# Patient Record
Sex: MALE | Race: WHITE | URBAN - METROPOLITAN AREA
[De-identification: names, ages, dates, MRNs, and addresses within clinical notes are randomized per-mention and may not be internally consistent; named-entity substitution may affect disease eponyms.]

---

## 2019-04-16 ENCOUNTER — OFFICE VISIT (OUTPATIENT)
Dept: FAMILY MEDICINE | Facility: CLINIC | Age: 34
End: 2019-04-16
Payer: COMMERCIAL

## 2019-04-16 ENCOUNTER — TELEPHONE (OUTPATIENT)
Dept: FAMILY MEDICINE | Facility: CLINIC | Age: 34
End: 2019-04-16

## 2019-04-16 VITALS
OXYGEN SATURATION: 98 % | DIASTOLIC BLOOD PRESSURE: 74 MMHG | WEIGHT: 215 LBS | SYSTOLIC BLOOD PRESSURE: 110 MMHG | TEMPERATURE: 97.4 F | HEART RATE: 71 BPM

## 2019-04-16 DIAGNOSIS — G43.809 OTHER MIGRAINE WITHOUT STATUS MIGRAINOSUS, NOT INTRACTABLE: ICD-10-CM

## 2019-04-16 DIAGNOSIS — R42 VERTIGO: ICD-10-CM

## 2019-04-16 DIAGNOSIS — R51.9 PRESSURE IN HEAD: Primary | ICD-10-CM

## 2019-04-16 PROBLEM — J30.2 SEASONAL ALLERGIC RHINITIS: Status: ACTIVE | Noted: 2017-12-15

## 2019-04-16 PROCEDURE — 99203 OFFICE O/P NEW LOW 30 MIN: CPT | Performed by: FAMILY MEDICINE

## 2019-04-16 NOTE — PATIENT INSTRUCTIONS
Please call to schedule MRI: For [facility] or [facility] ([address]) Radiology call [phone]. For [facility] Radiology call [phone].

## 2019-04-16 NOTE — TELEPHONE ENCOUNTER
"Patient scheduled to see Dr. Matias for c/o dizziness and head pressure.     Patient states these symptoms have happened before, but they are worse this time, so he is concerned. Pt informed me that when turning his head to the left or right, he feels a slightly dizzy and vision is a little blurry. He states that he had a headache for two days but no longer has one. He also experienced numbness for two days on his scalp and that the numbness also felt \"deeper\" in his \"head.\" Pt says that at one point within the last few days, he had cloudy thoughts.   Pt denies being confused or having a change in mental status. Denies severe headache, sudden changes in vision, sudden weakness on one side of the body, difficulty speaking or slurred speech, or chest pain.     Due to patient's symptoms, it was recommended that he keep his appointment today to be assessed. He has not ever been seen at this clinic.   Routing to provider as DANIELLE for pt's appointment today.   "

## 2019-04-16 NOTE — PROGRESS NOTES
"  SUBJECTIVE:   Donald Robins is a 33 year old male who presents to clinic today for the following   health issues:      Dizziness      Duration: 1 week, but previously for past 4 months off and on    Description   Feeling faint:  no   Feeling like the surroundings are moving: no   Loss of consciousness or falls: no     Intensity:  moderate    Accompanying signs and symptoms:   Nausea/vomitting: no   Palpitations: no   Weakness in arms or legs: no   Vision or speech changes: YES- vision  Ringing in ears (Tinnitus): no   Hearing loss related to dizziness: no   Other (fevers/chills/sweating/dyspnea): YES- simple head movement makes feel dizzy    History (similar episodes/head trauma/previous evaluation/recent bleeding): no    Precipitating or alleviating factors (new meds/chemicals): None  Worse with activity/head movement: YES- simple head movement    Therapies tried and outcome: None    Headaches      Duration: 4 days    Description  Location: bilateral in the temporal area   Character: pressure, palpitation in head, numb  Frequency:  conctant  Duration:  Prior episodes of length    Intensity:  moderate    Accompanying signs and symptoms:    Precipitating or Alleviating factors:  Nausea/vomiting: no  Dizziness: usually  Weakness or numbness: head  Visual changes: blury  Fever: no   YES- behind sinus feels pressure    History  Head trauma: no   Family history of migraines: no  Previous tests for headaches: no  Neurologist evaluations: no  Able to do daily activities when headache present: YES  Wake with headaches: no   Daily pain medication use: YES- ibuprofen      Pleasant 33 year old new to me in clinic today 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  For last 4 months patient has had sensation of pressure \"deep in head, behind right eye\".  Some chronic right sided head pain that does not go away with ibuprofen.  In last few days acute new vertigo with movement as well.  No other " "weakness.  Does have numbness and tingling to right head that comes and goes, and sensation of \"pressure\" in right head.  Really worried he has an aneurysm or tumor - does have family history of aneurysm.    Additional history: as documented    Reviewed  and updated as needed this visit by clinical staff  Tobacco  Allergies  Meds  Problems  Med Hx  Surg Hx  Fam Hx  Soc Hx          Reviewed and updated as needed this visit by Provider  Problems         ROS:  Constitutional, HEENT, cardiovascular, pulmonary, gi and gu systems are negative, except as otherwise noted.    OBJECTIVE:     /74 (Cuff Size: Adult Large)   Pulse 71   Temp 97.4  F (36.3  C) (Tympanic)   Wt 97.5 kg (215 lb)   SpO2 98%   There is no height or weight on file to calculate BMI.  GENERAL: healthy, alert and no distress  EYES: Eyes grossly normal to inspection, PERRL and conjunctivae and sclerae normal  HENT: ear canals and TM's normal, nose and mouth without ulcers or lesions  NECK: no adenopathy, no asymmetry, masses, or scars and thyroid normal to palpation  RESP: lungs clear to auscultation - no rales, rhonchi or wheezes  NEURO: Normal strength and tone, sensory exam grossly normal, mentation intact, speech normal, cranial nerves 2-12 intact, DTR's normal and symmetric, gait normal including heel/toe/tandem walking, Romberg normal and rapid alternating movements normal  PSYCH: mentation appears normal, affect normal/bright    ASSESSMENT/PLAN:       ICD-10-CM    1. Pressure in head R51 MRA Brain ([place]) w Contrast     MR Brain w/o & w Contrast   2. Vertigo R42 MRA Brain ([place]) w Contrast     MR Brain w/o & w Contrast   3. Other migraine without status migrainosus, not intractable G43.809 MRA Brain ([place]) w Contrast     MR Brain w/o & w Contrast     33 year old with 4 months of progressive head pressure and headache and now with new vertigo in last week, and family history of aneurysm.      Will " get imaging to rule out concerning pathology.  Patient is quite concerned and would like imaging rather than watchful waiting.    Will send mychart with results - if negative may just have been BENIGN POSITIONAL PAROXYSMAL VERTIGO which was discussed, and anxiety.      Will come back to establish care after MRI/MRA completed, and follow up on above.    Discussed with patient, all questions answered, in agreement with this plan, will return or seek further care if not improving or worsening.    Brooklyn Matias MD  [facility]

## 2019-04-24 ENCOUNTER — ANCILLARY PROCEDURE (OUTPATIENT)
Dept: MRI IMAGING | Facility: CLINIC | Age: 34
End: 2019-04-24
Attending: FAMILY MEDICINE
Payer: COMMERCIAL

## 2019-04-24 DIAGNOSIS — G43.809 OTHER MIGRAINE WITHOUT STATUS MIGRAINOSUS, NOT INTRACTABLE: ICD-10-CM

## 2019-04-24 DIAGNOSIS — R42 VERTIGO: ICD-10-CM

## 2019-04-24 DIAGNOSIS — R51.9 PRESSURE IN HEAD: ICD-10-CM

## 2019-04-24 RX ORDER — GADOBUTROL 604.72 MG/ML
10 INJECTION INTRAVENOUS ONCE
Status: COMPLETED | OUTPATIENT
Start: 2019-04-24 | End: 2019-04-24

## 2019-04-24 RX ADMIN — GADOBUTROL 10 ML: 604.72 INJECTION INTRAVENOUS at 19:11

## 2019-04-25 NOTE — DISCHARGE INSTRUCTIONS

## 2019-04-26 NOTE — RESULT ENCOUNTER NOTE
Dear Donald,  I have reviewed your results, and they are all in an acceptable/normal range.  At this point I recommend no change to your plan of care - please let me know if you have any questions or concerns.  Sincerely,  Dr. Brooklyn Matias MD    Thank you for choosing the [facility] MRI as your health care provider. Please don't hesitate to call with any questions or concerns [phone].

## 2019-04-26 NOTE — RESULT ENCOUNTER NOTE
Dear Donald,  I have reviewed your results, and they are all in an acceptable/normal range.  At this point I recommend no change to your plan of care - please let me know if you have any questions or concerns.  Sincerely,  Dr. Brooklyn Matias MD    Thank you for choosing the [facility] MRI as your health care provider. Please don't hesitate to call with any questions or concerns [phone].

## 2019-08-19 ENCOUNTER — OFFICE VISIT (OUTPATIENT)
Dept: FAMILY MEDICINE | Facility: CLINIC | Age: 34
End: 2019-08-19
Payer: COMMERCIAL

## 2019-08-19 VITALS
HEIGHT: 70 IN | DIASTOLIC BLOOD PRESSURE: 70 MMHG | RESPIRATION RATE: 20 BRPM | HEART RATE: 68 BPM | OXYGEN SATURATION: 97 % | SYSTOLIC BLOOD PRESSURE: 118 MMHG | TEMPERATURE: 98.9 F | WEIGHT: 208 LBS | BODY MASS INDEX: 29.78 KG/M2

## 2019-08-19 DIAGNOSIS — R20.2 NUMBNESS AND TINGLING IN BOTH HANDS: Primary | ICD-10-CM

## 2019-08-19 DIAGNOSIS — Z83.3 FAMILY HISTORY OF DIABETES MELLITUS: ICD-10-CM

## 2019-08-19 DIAGNOSIS — Z82.0 FAMILY HISTORY OF SLEEP APNEA: ICD-10-CM

## 2019-08-19 DIAGNOSIS — R39.9 URINARY SYMPTOM OR SIGN: ICD-10-CM

## 2019-08-19 DIAGNOSIS — R09.81 NASAL CONGESTION: ICD-10-CM

## 2019-08-19 DIAGNOSIS — G47.00 INSOMNIA, UNSPECIFIED TYPE: ICD-10-CM

## 2019-08-19 DIAGNOSIS — R20.0 NUMBNESS AND TINGLING IN BOTH HANDS: Primary | ICD-10-CM

## 2019-08-19 LAB
ALBUMIN UR-MCNC: 30 MG/DL
APPEARANCE UR: CLEAR
BILIRUB UR QL STRIP: NEGATIVE
COLOR UR AUTO: YELLOW
GLUCOSE UR STRIP-MCNC: NEGATIVE MG/DL
HGB UR QL STRIP: NEGATIVE
KETONES UR STRIP-MCNC: NEGATIVE MG/DL
LEUKOCYTE ESTERASE UR QL STRIP: NEGATIVE
NITRATE UR QL: NEGATIVE
PH UR STRIP: 7 PH (ref 5–7)
RBC #/AREA URNS AUTO: NORMAL /HPF
SOURCE: ABNORMAL
SP GR UR STRIP: 1.02 (ref 1–1.03)
UROBILINOGEN UR STRIP-ACNC: 0.2 EU/DL (ref 0.2–1)
WBC #/AREA URNS AUTO: NORMAL /HPF

## 2019-08-19 PROCEDURE — 85025 COMPLETE CBC W/AUTO DIFF WBC: CPT | Performed by: INTERNAL MEDICINE

## 2019-08-19 PROCEDURE — 81001 URINALYSIS AUTO W/SCOPE: CPT | Performed by: INTERNAL MEDICINE

## 2019-08-19 PROCEDURE — 84443 ASSAY THYROID STIM HORMONE: CPT | Performed by: INTERNAL MEDICINE

## 2019-08-19 PROCEDURE — 80053 COMPREHEN METABOLIC PANEL: CPT | Performed by: INTERNAL MEDICINE

## 2019-08-19 PROCEDURE — 36415 COLL VENOUS BLD VENIPUNCTURE: CPT | Performed by: INTERNAL MEDICINE

## 2019-08-19 PROCEDURE — 99214 OFFICE O/P EST MOD 30 MIN: CPT | Performed by: INTERNAL MEDICINE

## 2019-08-19 PROCEDURE — 82607 VITAMIN B-12: CPT | Performed by: INTERNAL MEDICINE

## 2019-08-19 NOTE — PATIENT INSTRUCTIONS
Let's plan on appointments with ENT and with the sleep clinic.                          Call [phone] for the ENT clinic.                           I will let you know your lab results.                                                I suggest a follow up appointment here after the evaluations with ENT and the sleep clinic.

## 2019-08-19 NOTE — PROGRESS NOTES
"Subjective     Donald Robins is a 34 year old male who presents to clinic today for the following health issues:    HPI   ED/UC Followup:    Facility:  [facility]  Date of visit: 8/17/2019  Reason for visit: UTI or Prostate issues  Current Status: stable, and per/pt that \"everything checked out ok\". Please see chart for Care Everywhere.             Has a variety of sx.                           Had 2 wks of mild/vague urinary and perineal sx; urine and STD labs at urgent care were all neg/normal.                    Drinking more fluids;sx are improving.                                  Has a variety of other sx; intermittent paresthesias both 5th fingers;sometimes lateral feet also.         Chronic L nasal congestion; surgery for deviated nasal septum did not help.                           Snorts awake.           Sleeps poorly;light sleeper.       He has tried melatonin, but had nightmares.    He avoids caffeine after his morning coffee.                                                Family hx of diabetes, and DARON.                                               Allergies   Allergen Reactions     Sulfa Drugs Rash     BP Readings from Last 3 Encounters:   08/19/19 118/70   04/16/19 110/74    Wt Readings from Last 3 Encounters:   08/19/19 94.3 kg (208 lb)   04/16/19 97.5 kg (215 lb)                      Reviewed and updated as needed this visit by Provider         Review of Systems See above plus  ROS COMP: CONSTITUTIONAL:NEGATIVE for fever, chills, change in weight and POSITIVE  for fatigue  RESP:NEGATIVE for significant cough or SOB  CV: NEGATIVE for chest pain, palpitations or peripheral edema  : Negative for hematuria  NEURO: NEGATIVE for dizziness/lightheadedness, dysarthria and gait disturbance  PSYCHIATRIC: NEGATIVE for changes in mood or affect      Objective    /70 (BP Location: Left arm, Patient Position: Chair, Cuff Size: Adult Large)   Pulse 68   Temp 98.9  F (37.2 "  C)   Resp 20   Wt 94.3 kg (208 lb)   SpO2 97%   Body mass index is 29.84 kg/m .  Physical Exam   GENERAL APPEARANCE: alert and no distress  HENT: Narrow nasal passages  RESP: no rales or rhonchi  CV: regular rates and rhythm, normal S1 S2, no S3 or S4 and no murmur, click or rub  NEURO: Normal strength and tone, mentation intact and speech normal    Diagnostic Test Results:  Results for orders placed or performed in visit on 08/19/19 (from the past 24 hour(s))   *UA reflex to Microscopic and Culture ([place] and [facility] (except [place] and [place])   Result Value Ref Range    Color Urine Yellow     Appearance Urine Clear     Glucose Urine Negative NEG^Negative mg/dL    Bilirubin Urine Negative NEG^Negative    Ketones Urine Negative NEG^Negative mg/dL    Specific Gravity Urine 1.020 1.003 - 1.035    Blood Urine Negative NEG^Negative    pH Urine 7.0 5.0 - 7.0 pH    Protein Albumin Urine 30 (A) NEG^Negative mg/dL    Urobilinogen Urine 0.2 0.2 - 1.0 EU/dL    Nitrite Urine Negative NEG^Negative    Leukocyte Esterase Urine Negative NEG^Negative    Source Midstream Urine    Urine Microscopic   Result Value Ref Range    WBC Urine 0 - 5 OTO5^0 - 5 /HPF    RBC Urine O - 2 OTO2^O - 2 /HPF           Assessment & Plan     Donald was seen today for uti.    Diagnoses and all orders for this visit:    Numbness and tingling in both hands  -     Comprehensive metabolic panel (BMP + Alb, Alk Phos, ALT, AST, Total. Bili, TP)  -     Vitamin B12  -     CBC with platelets and differential  -     TSH with free T4 reflex    Insomnia, unspecified type  -     SLEEP EVALUATION & MANAGEMENT REFERRAL - ADULT -[facility] [place] [phone] (Age 15 and up); Future    Nasal congestion  -     OTOLARYNGOLOGY REFERRAL    Family history of diabetes mellitus  -     Comprehensive metabolic panel (BMP + Alb, Alk Phos, ALT, AST, Total. Bili, TP)    Family history of sleep apnea  -     SLEEP EVALUATION & MANAGEMENT  REFERRAL - ADULT -[facility] [place] [phone] (Age 15 and up); Future    Urinary symptom or sign  -     *UA reflex to Microscopic and Culture ([facility] (except [place] and [place])  -     Urine Microscopic           Summary and implications:  We reviewed multiple issues.           We reviewed all of the issues on the diagnoses list.                He has a variety of symptoms and issues. He may be dealing with ulnar neuropathy. I advised that he may replace his elbows on hard surfaces.        Lab work ordered to include B12 and thyroid level and nonfasting glucose.               He has disturbed sleep, and chronic nasal obstruction/congestion.                    Patient Instructions   Let's plan on appointments with ENT and with the sleep clinic.                          Call [phone] for the ENT clinic.                           I will let you know your lab results.                                                I suggest a follow up appointment here after the evaluations with ENT and the sleep clinic.            Return in about 3 months (around 11/19/2019) for follow up of several issues.    Leif Donahue MD  [facility]    Results for orders placed or performed in visit on 08/19/19   *UA reflex to Microscopic and Culture ([facility] (except [place] and [place])   Result Value Ref Range    Color Urine Yellow     Appearance Urine Clear     Glucose Urine Negative NEG^Negative mg/dL    Bilirubin Urine Negative NEG^Negative    Ketones Urine Negative NEG^Negative mg/dL    Specific Gravity Urine 1.020 1.003 - 1.035    Blood Urine Negative NEG^Negative    pH Urine 7.0 5.0 - 7.0 pH    Protein Albumin Urine 30 (A) NEG^Negative mg/dL    Urobilinogen Urine 0.2 0.2 - 1.0 EU/dL    Nitrite Urine Negative NEG^Negative    Leukocyte Esterase Urine Negative NEG^Negative    Source Midstream Urine    Urine Microscopic   Result  Value Ref Range    WBC Urine 0 - 5 OTO5^0 - 5 /HPF    RBC Urine O - 2 OTO2^O - 2 /HPF   Comprehensive metabolic panel (BMP + Alb, Alk Phos, ALT, AST, Total. Bili, TP)   Result Value Ref Range    Sodium 141 133 - 144 mmol/L    Potassium 4.3 3.4 - 5.3 mmol/L    Chloride 107 94 - 109 mmol/L    Carbon Dioxide 24 20 - 32 mmol/L    Anion Gap 10 3 - 14 mmol/L    Glucose 98 70 - 99 mg/dL    Urea Nitrogen 15 7 - 30 mg/dL    Creatinine 1.15 0.66 - 1.25 mg/dL    GFR Estimate 84 >60 mL/min/[1.73_m2]    GFR Estimate If Black >90 >60 mL/min/[1.73_m2]    Calcium 9.1 8.5 - 10.1 mg/dL    Bilirubin Total 0.7 0.2 - 1.3 mg/dL    Albumin 4.4 3.4 - 5.0 g/dL    Protein Total 7.6 6.8 - 8.8 g/dL    Alkaline Phosphatase 62 40 - 150 U/L    ALT 26 0 - 70 U/L    AST 25 0 - 45 U/L   Vitamin B12   Result Value Ref Range    Vitamin B12 964 193 - 986 pg/mL   CBC with platelets and differential   Result Value Ref Range    WBC 7.2 4.0 - 11.0 10e9/L    RBC Count 4.61 4.4 - 5.9 10e12/L    Hemoglobin 14.9 13.3 - 17.7 g/dL    Hematocrit 43.9 40.0 - 53.0 %    MCV 95 78 - 100 fl    MCH 32.3 26.5 - 33.0 pg    MCHC 33.9 31.5 - 36.5 g/dL    RDW 12.1 10.0 - 15.0 %    Platelet Count 190 150 - 450 10e9/L    % Neutrophils 56.2 %    % Lymphocytes 32.5 %    % Monocytes 9.3 %    % Eosinophils 1.4 %    % Basophils 0.6 %    Absolute Neutrophil 4.0 1.6 - 8.3 10e9/L    Absolute Lymphocytes 2.3 0.8 - 5.3 10e9/L    Absolute Monocytes 0.7 0.0 - 1.3 10e9/L    Absolute Eosinophils 0.1 0.0 - 0.7 10e9/L    Absolute Basophils 0.0 0.0 - 0.2 10e9/L    Diff Method Automated Method    TSH with free T4 reflex   Result Value Ref Range    TSH 2.58 0.40 - 4.00 mU/L     My chart message sent.    Your lab results are normal,including the liver,kidney,glucose,bone marrow,thyroid,and the B12 level.      Please go ahead with the ear,nose,and throat consult, and the sleep clinic consult.

## 2019-08-19 NOTE — PROGRESS NOTES
"Grady Robins is a 34 year old male who presents to clinic today for the following health issues:    HPI   Genitourinary - Male  Onset: ***    Description:   Dysuria (painful urination): { :039585}  Hematuria (blood in urine): { :504166}  Frequency: { :816319}  Are you urinating at night : { :991132}  Hesitancy (delay in urine): { :217886}  Retention (unable to empty): { :770819}  Decrease in urinary flow: { :540030}  Incontinence: { :777601}    Progression of Symptoms:  {.:982983}    Accompanying Signs & Symptoms:  Fever: { :989139}  Back/Flank pain: { :776377}  Urethral discharge: { :712665}  Testicle lumps/masses/pain: { :885983}  Nausea and/or vomiting: { :803088}  Abdominal pain: { :950448}    History:   History of frequent UTI's: { :756667}  History of kidney stones: { :741531}  History of hernias: { :679530}  Personal or Family history of Prostate problems: {.:253408::\"no\"}  Sexually active: { :041154}    Precipitating factors:   ***    Alleviating factors:  ***    {additonal problems for provider to add (Optional):807227}    {HIST REVIEW/ LINKS 2 (Optional):315989}    {Additional problems for the provider to add (optional):435893}  Reviewed and updated as needed this visit by Provider         Review of Systems   {ROS COMP (Optional):229678}      Objective    There were no vitals taken for this visit.  There is no height or weight on file to calculate BMI.  Physical Exam   {Exam List (Optional):266615}    {Diagnostic Test Results (Optional):691998::\"Diagnostic Test Results:\",\"Labs reviewed in Epic\"}        {PROVIDER CHARTING PREFERENCE:294271}      "

## 2019-08-20 LAB — VIT B12 SERPL-MCNC: 964 PG/ML (ref 193–986)

## 2019-08-21 LAB
ALBUMIN SERPL-MCNC: 4.4 G/DL (ref 3.4–5)
ALP SERPL-CCNC: 62 U/L (ref 40–150)
ALT SERPL W P-5'-P-CCNC: 26 U/L (ref 0–70)
ANION GAP SERPL CALCULATED.3IONS-SCNC: 10 MMOL/L (ref 3–14)
AST SERPL W P-5'-P-CCNC: 25 U/L (ref 0–45)
BASOPHILS # BLD AUTO: 0 10E9/L (ref 0–0.2)
BASOPHILS NFR BLD AUTO: 0.6 %
BILIRUB SERPL-MCNC: 0.7 MG/DL (ref 0.2–1.3)
BUN SERPL-MCNC: 15 MG/DL (ref 7–30)
CALCIUM SERPL-MCNC: 9.1 MG/DL (ref 8.5–10.1)
CHLORIDE SERPL-SCNC: 107 MMOL/L (ref 94–109)
CO2 SERPL-SCNC: 24 MMOL/L (ref 20–32)
CREAT SERPL-MCNC: 1.15 MG/DL (ref 0.66–1.25)
DIFFERENTIAL METHOD BLD: NORMAL
EOSINOPHIL # BLD AUTO: 0.1 10E9/L (ref 0–0.7)
EOSINOPHIL NFR BLD AUTO: 1.4 %
ERYTHROCYTE [DISTWIDTH] IN BLOOD BY AUTOMATED COUNT: 12.1 % (ref 10–15)
GFR SERPL CREATININE-BSD FRML MDRD: 84 ML/MIN/{1.73_M2}
GLUCOSE SERPL-MCNC: 98 MG/DL (ref 70–99)
HCT VFR BLD AUTO: 43.9 % (ref 40–53)
HGB BLD-MCNC: 14.9 G/DL (ref 13.3–17.7)
LYMPHOCYTES # BLD AUTO: 2.3 10E9/L (ref 0.8–5.3)
LYMPHOCYTES NFR BLD AUTO: 32.5 %
MCH RBC QN AUTO: 32.3 PG (ref 26.5–33)
MCHC RBC AUTO-ENTMCNC: 33.9 G/DL (ref 31.5–36.5)
MCV RBC AUTO: 95 FL (ref 78–100)
MONOCYTES # BLD AUTO: 0.7 10E9/L (ref 0–1.3)
MONOCYTES NFR BLD AUTO: 9.3 %
NEUTROPHILS # BLD AUTO: 4 10E9/L (ref 1.6–8.3)
NEUTROPHILS NFR BLD AUTO: 56.2 %
PLATELET # BLD AUTO: 190 10E9/L (ref 150–450)
POTASSIUM SERPL-SCNC: 4.3 MMOL/L (ref 3.4–5.3)
PROT SERPL-MCNC: 7.6 G/DL (ref 6.8–8.8)
RBC # BLD AUTO: 4.61 10E12/L (ref 4.4–5.9)
SODIUM SERPL-SCNC: 141 MMOL/L (ref 133–144)
TSH SERPL DL<=0.005 MIU/L-ACNC: 2.58 MU/L (ref 0.4–4)
WBC # BLD AUTO: 7.2 10E9/L (ref 4–11)

## 2019-08-21 NOTE — TELEPHONE ENCOUNTER
FUTURE VISIT INFORMATION      FUTURE VISIT INFORMATION:    Date: 8/23/19    Time: 7:15AM    [place] [facility] [place]:    Referring provider:  Leif Donahue MD    Referring providers clinic:  [facility]    Reason for visit/diagnosis : Nasal congestion     RECORDS REQUESTED FROM:       Clinic name Comments Records Status Imaging Status   [facility] 8/19/19 notes with Dr Donahue [facility] Imaging 4/24/19 MR Brain  [facility] PACS

## 2019-08-23 ENCOUNTER — PRE VISIT (OUTPATIENT)
Dept: OTOLARYNGOLOGY | Facility: CLINIC | Age: 34
End: 2019-08-23

## 2019-08-23 ENCOUNTER — OFFICE VISIT (OUTPATIENT)
Dept: OTOLARYNGOLOGY | Facility: CLINIC | Age: 34
End: 2019-08-23
Attending: INTERNAL MEDICINE
Payer: COMMERCIAL

## 2019-08-23 VITALS
SYSTOLIC BLOOD PRESSURE: 108 MMHG | HEART RATE: 70 BPM | HEIGHT: 70 IN | DIASTOLIC BLOOD PRESSURE: 71 MMHG | WEIGHT: 210 LBS | BODY MASS INDEX: 30.06 KG/M2

## 2019-08-23 DIAGNOSIS — J34.89 NASAL OBSTRUCTION: Primary | ICD-10-CM

## 2019-08-23 DIAGNOSIS — J30.1 ALLERGIC RHINITIS DUE TO POLLEN, UNSPECIFIED SEASONALITY: ICD-10-CM

## 2019-08-23 DIAGNOSIS — J32.0 CHRONIC MAXILLARY SINUSITIS: ICD-10-CM

## 2019-08-23 RX ORDER — EUCALYPTUS/PEPPERMINT OIL
SOLUTION, NON-ORAL NASAL
Qty: 30 ML | Refills: 2 | Status: SHIPPED | OUTPATIENT
Start: 2019-08-23 | End: 2019-10-24

## 2019-08-23 ASSESSMENT — MIFFLIN-ST. JEOR: SCORE: 1916.29

## 2019-08-23 ASSESSMENT — PAIN SCALES - GENERAL: PAINLEVEL: NO PAIN (0)

## 2019-08-23 NOTE — PROGRESS NOTES
The patient presents with a history of nasal congestion and rhinitis. He reports that he has had nasal surgery, but his symptoms did not improve after this procedure. The patient reports events of sinusitis, facial pain and purulent nasal discharge. The patient denies chronic or recurrent tonsillitis, chronic or recurrent pharyngitis. The patient denies otalgia, otorrhea, eustachian tube dysfunction, ear infections, dizziness or tinnitus.       This patient is seen in consultation at the request of Dr. Leif Donahue.    All other systems were reviewed and they are either negative or they are not directly pertinent to this Otolaryngology examination.      Past Medical History:    No past medical history on file.    Past Surgical History:    Past Surgical History:   Procedure Laterality Date     SEPTOPLASTY      not helpful       Medications:    No current outpatient medications on file.    Allergies:    Sulfa drugs    Physical Examination:    The patient is a well developed, well nourished male in no apparent distress.  He is normocepahlic, atraumatic with pupils equally round and reactive to light.    Oral Cavity Examination: Normal Mucosa with no masses or lesions  Nasal Examination: Congested nasal turbinates and nasal mucosa with no masses or lesions, septal perforation.  Ear Examination: Ear canals clear, tympanic membranes and middle ear spaces normal  Neurological Examination: Facial nerve function intact and symmetric  Integumentary Examination: No lesions on the skin of the head or neck  Neck Examination: No masses or lesions, no lymphadenopathy  Endocrine Examination: Normal thyroid examination  Flexible Fiberoptic Laryngoscopy:  Mild crusting in the nasopharynx, normal base of tongue, pyriform sinuses, epiglottis, valleculae, false vocal cords, true vocal cords, and larynx.  Normal motion of the vocal cords with no lesions, masses, nodules, or polyps bilaterally.     Assessment and Plan:    The patient  presents with a history of nasal obstruction and rhinitis. He has some crusting in the nasopharynx and he has a perforation of the nasal septum. His turbinates are enlarged. He will be referred for a CT scan of the sinuses to assess for chronic sinusitis and he will be referred to Dr. Manish Hunter or Dr. Clint Zaman for consideration of surgical options of management. He will be referred to Dr. Kiel Nair for an allergy evaluation and he will use ponaris nasal drops for the nasopharyngeal crusting.       PreOp Diagnosis:  Nasal Obstruction    PostOp Diagnosis: Nasal Obstruction    Procedure: Flexible Fiberoptic Laryngoscopy    Estimated Blood Loss: None    Complications: None    Consent: The patient was informed of the possible complications and probable outcomes of the procedure and the patient gave informed consent.    Fluids: None    Drains: None    Disposition: to home    Findings: Normal nasopharynx, base of tongue, pyriform sinuses, epiglottis, valleculae, false vocal cords, true vocal cords, and larynx.  Normal motion of the vocal cords with no lesions, masses, nodules, or polyps bilaterally.     Description of Procedure:    The patient was positioned on the clinic room chair. The nose was decongested and anesthetized with 2 puffs in each nostrils lidocaine hcl 4% and oxymetazoline hcl 0.05% topical solution. The flexible fiberoptic scope was passed into the each nostrils and the nasal passages visualized. The scope was passed through the left nostril into the nasopharynx which was normal. The base of tongue and tonsil tissues were visualized and found to be normal. The vocal cords and larynx were visualized and the true vocal cords were visualized and found to be normal.       CC: Dr. Leif Donahue

## 2019-08-23 NOTE — PATIENT INSTRUCTIONS
1.  You were seen in the ENT Clinic today by Dr. Sweeney.  If you have any questions or concerns after your appointment, please call [phone]. Press option #1 for scheduling related needs. Press option #3 for Nurse advice.    2.  Please schedule an appointment for the following:   - CT Scan - Sinus   - Dr. Nair - Allergy Consultation    3.  Plan is to return to clinic to see Dr. Clint Zaman or Dr. Manish Hunter for further evaluation.      Sheela Newby LPN  [facility] Otolaryngology  [phone]    The patient presents with a history of nasal obstruction and rhinitis. He has some crusting in the nasopharynx and he has a perforation of the nasal septum. His turbinates are enlarged. He will be referred for a CT scan of the sinuses to assess for chronic sinusitis and he will be referred to Dr. Manish Hunter or Dr. Clint Zaman for consideration of surgical options of management. He will be referred to Dr. Kiel Nair for an allergy evaluation and he will use ponaris nasal drops for the nasopharyngeal crusting.

## 2019-08-23 NOTE — NURSING NOTE
"Chief Complaint   Patient presents with     Consult     nasal congestion      Blood pressure 108/71, pulse 70, height 1.79 m (5' 10.47\"), weight 95.3 kg (210 lb).    Zach Guadalupe LPN'  "

## 2019-08-23 NOTE — LETTER
8/23/2019       RE: Donald Robins  [address]  [address]  [facility] [address]     Dear Colleague,    Thank you for referring your patient, Donald Robins, to the [facility] EAR NOSE AND THROAT at [facility]. Please see a copy of my visit note below.    The patient presents with a history of nasal congestion and rhinitis. He reports that he has had nasal surgery, but his symptoms did not improve after this procedure. The patient reports events of sinusitis, facial pain and purulent nasal discharge. The patient denies chronic or recurrent tonsillitis, chronic or recurrent pharyngitis. The patient denies otalgia, otorrhea, eustachian tube dysfunction, ear infections, dizziness or tinnitus.       This patient is seen in consultation at the request of Dr. Leif Donahue.    All other systems were reviewed and they are either negative or they are not directly pertinent to this Otolaryngology examination.      Past Medical History:    No past medical history on file.    Past Surgical History:    Past Surgical History:   Procedure Laterality Date     SEPTOPLASTY      not helpful       Medications:    No current outpatient medications on file.    Allergies:    Sulfa drugs    Physical Examination:    The patient is a well developed, well nourished male in no apparent distress.  He is normocepahlic, atraumatic with pupils equally round and reactive to light.    Oral Cavity Examination: Normal Mucosa with no masses or lesions  Nasal Examination: Congested nasal turbinates and nasal mucosa with no masses or lesions, septal perforation.  Ear Examination: Ear canals clear, tympanic membranes and middle ear spaces normal  Neurological Examination: Facial nerve function intact and symmetric  Integumentary Examination: No lesions on the skin of the head or neck  Neck Examination: No masses or lesions, no lymphadenopathy  Endocrine Examination: Normal thyroid examination  Flexible Fiberoptic  Laryngoscopy:  Mild crusting in the nasopharynx, normal base of tongue, pyriform sinuses, epiglottis, valleculae, false vocal cords, true vocal cords, and larynx.  Normal motion of the vocal cords with no lesions, masses, nodules, or polyps bilaterally.     Assessment and Plan:    The patient presents with a history of nasal obstruction and rhinitis. He has some crusting in the nasopharynx and he has a perforation of the nasal septum. His turbinates are enlarged. He will be referred for a CT scan of the sinuses to assess for chronic sinusitis and he will be referred to Dr. Manish Hunter or Dr. Clint Zaman for consideration of surgical options of management. He will be referred to Dr. Kiel Nair for an allergy evaluation and he will use ponaris nasal drops for the nasopharyngeal crusting.       PreOp Diagnosis:  Nasal Obstruction    PostOp Diagnosis: Nasal Obstruction    Procedure: Flexible Fiberoptic Laryngoscopy    Estimated Blood Loss: None    Complications: None    Consent: The patient was informed of the possible complications and probable outcomes of the procedure and the patient gave informed consent.    Fluids: None    Drains: None    Disposition: to home    Findings: Normal nasopharynx, base of tongue, pyriform sinuses, epiglottis, valleculae, false vocal cords, true vocal cords, and larynx.  Normal motion of the vocal cords with no lesions, masses, nodules, or polyps bilaterally.     Description of Procedure:    The patient was positioned on the clinic room chair. The nose was decongested and anesthetized with 2 puffs in each nostrils lidocaine hcl 4% and oxymetazoline hcl 0.05% topical solution. The flexible fiberoptic scope was passed into the each nostrils and the nasal passages visualized. The scope was passed through the left nostril into the nasopharynx which was normal. The base of tongue and tonsil tissues were visualized and found to be normal. The vocal cords and larynx were visualized and the  true vocal cords were visualized and found to be normal.       CC: Dr. Leif Donahue      Again, thank you for allowing me to participate in the care of your patient.      Sincerely,    Clint Sweeney MD

## 2019-08-27 NOTE — TELEPHONE ENCOUNTER
FUTURE VISIT INFORMATION      FUTURE VISIT INFORMATION:    Date: 9.17.19    Time: 1:00    Location:  Allergy  REFERRAL INFORMATION:    Referring provider:  Dr. Clint Sweeney    Referring providers clinic:  ENT    Reason for visit/diagnosis:  Allergy Consult    RECORDS REQUESTED FROM:       Clinic name Comments Records Status Imaging Status   ENT 8.23.19 Dr. Sweeney In [facility]

## 2019-08-30 ENCOUNTER — OFFICE VISIT (OUTPATIENT)
Dept: OTOLARYNGOLOGY | Facility: CLINIC | Age: 34
End: 2019-08-30
Payer: COMMERCIAL

## 2019-08-30 ENCOUNTER — ANCILLARY PROCEDURE (OUTPATIENT)
Dept: CT IMAGING | Facility: CLINIC | Age: 34
End: 2019-08-30
Attending: OTOLARYNGOLOGY
Payer: COMMERCIAL

## 2019-08-30 ENCOUNTER — PRE VISIT (OUTPATIENT)
Dept: SLEEP MEDICINE | Facility: CLINIC | Age: 34
End: 2019-08-30

## 2019-08-30 VITALS
BODY MASS INDEX: 29.59 KG/M2 | WEIGHT: 209 LBS | DIASTOLIC BLOOD PRESSURE: 66 MMHG | HEART RATE: 76 BPM | SYSTOLIC BLOOD PRESSURE: 124 MMHG

## 2019-08-30 DIAGNOSIS — J34.89 NASAL OBSTRUCTION: Primary | ICD-10-CM

## 2019-08-30 DIAGNOSIS — J34.89 NASAL SEPTAL PERFORATION: ICD-10-CM

## 2019-08-30 DIAGNOSIS — J32.0 CHRONIC MAXILLARY SINUSITIS: ICD-10-CM

## 2019-08-30 DIAGNOSIS — J34.3 HYPERTROPHY OF INFERIOR NASAL TURBINATE: Primary | ICD-10-CM

## 2019-08-30 DIAGNOSIS — J30.1 ALLERGIC RHINITIS DUE TO POLLEN, UNSPECIFIED SEASONALITY: ICD-10-CM

## 2019-08-30 DIAGNOSIS — R06.83 SNORING: ICD-10-CM

## 2019-08-30 DIAGNOSIS — R09.81 NASAL CONGESTION: ICD-10-CM

## 2019-08-30 ASSESSMENT — PAIN SCALES - GENERAL: PAINLEVEL: NO PAIN (0)

## 2019-08-30 NOTE — PROGRESS NOTES
"                   [facility]                   New Patient Visit      Encounter date: August 30, 2019    Referring Provider:   Clint Sweeney MD  [address]  [place] [address]    Chief Complaint: nasal congestion; inferior turbinate hypertrophy    History of Present Illness: Donald Robins is a 34-year-old gentleman who comes to see me for several year history of nasal obstruction.  He is referred to me by Dr. Sweeney.  He has bilateral nasal congestion that alternates.  Today he states it is more severe on the right.  He had a septoplasty performed  several years ago she stated did not help with his nasal obstruction.  He has tried topical nasal steroid sprays for his nasal obstruction which did not help as well.  He denies significant nasal pruritus, drainage or frequent pain or pressure requiring antibiotics.  He describes his symptoms of \"sinusitis\"' which he describes more like headaches.  There is no associated purulent drainage.  He had a CT performed today which I reviewed.  He also has symptoms of mental fogginess and poor concentration.  He also relates to me that he has snoring with possible apneas at night.  He is pending evaluation by sleep specialist as well as allergist.    Review of systems: A 14-point review of systems has been conducted and was negative for any notable symptoms, except as dictated in the history of present illness.     Past Medical History:   Diagnosis Date     Tinnitus         Past Surgical History:   Procedure Laterality Date     SEPTOPLASTY      not helpful        Family History   Problem Relation Age of Onset     Diabetes Father      Anemia Sister         Social History     Socioeconomic History     Marital status: Single     Spouse name: Not on file     Number of children: Not on file     Years of education: Not on file     Highest education level: Not on file   Occupational History     Not on file   Social Needs     Financial " resource strain: Not on file     Food insecurity:     Worry: Not on file     Inability: Not on file     Transportation needs:     Medical: Not on file     Non-medical: Not on file   Tobacco Use     Smoking status: Never Smoker     Smokeless tobacco: Never Used   Substance and Sexual Activity     Alcohol use: Yes     Comment: social     Drug use: Never     Sexual activity: Yes     Partners: Female     Birth control/protection: Pull-out method, Condom   Lifestyle     Physical activity:     Days per week: Not on file     Minutes per session: Not on file     Stress: Not on file   Relationships     Social connections:     Talks on phone: Not on file     Gets together: Not on file     Attends Caodaism service: Not on file     Active member of club or organization: Not on file     Attends meetings of clubs or organizations: Not on file     Relationship status: Not on file     Intimate partner violence:     Fear of current or ex partner: Not on file     Emotionally abused: Not on file     Physically abused: Not on file     Forced sexual activity: Not on file   Other Topics Concern     Not on file   Social History Narrative    [employer]; ; 5 yrs.              Physical Exam:  Vital signs: VSS  General Appearance: No acute distress, appropriate demeanor, conversant  Eyes: moist conjunctivae; EOMI; pupils symmetric; visual acuity grossly intact; no proptosis  Head: normocephalic; overall symmetric appearance without deformity  Face: overall symmetric without deformity; HB I-VI  Ears: Normal appearance of external ear; external meatus normal in appearance; TMs intact without perforation bilaterally;   Nose: No external deformity; septum with small anterior perforation; inferior turbinates with significant hypertrophy; bilateral nasal vestibular bodies are present  Oral Cavity/oropharynx: Normal appearance of mucosa; tongue midline; no mass or lesions; tonsils 1+; oropharynx without obvious mucosal  abnormality  Neck: no palpable lymphadenopathy; thyroid without palpable nodules  Lungs: symmetric chest rise; no wheezing  CV: Good distal perfusion; normal hear rate  Extremities: No deformity  Neurologic Exam: Cranial nerves II-XII are grossly intact; no focal deficit      Procedure Note  Procedure performed: Rigid nasal endoscopy  Indication: To evaluate for sinonasal pathology not visualized on routine anterior rhinoscopy  Anesthesia: 4% topical lidocaine with 0.05% oxymetazoline  Description of procedure: A 30 degree, 3 mm rigid endoscope was inserted into bilateral nasal cavities and the nasal valves, nasal cavity, middle meatus, sphenoethmoid recess, and nasopharynx were thoroughly evaluated for evidence of obstruction, edema, purulence, polyps and/or mass/lesion.     [place]-Gustavo Endoscopic Scoring System  Endoscopic observation Right Left   Polyps in middle meatus (0 = absent, 1 = restricted to middle meatus, 2 = Beyond middle meatus) 0 0   Discharge (0 = absent, 1 = thin and clear, 2 = thick, purulent) 0 0   Edema (0 = absent, 1 = mild-moderate, 2 = moderate-severe) 1 0   Crusting (0 = absent, 1 = mild-moderate, 2 = moderate-severe) 0 0   Scarring (0= absent, 1 = mild-moderate, 2 = moderate-severe) 0 0   Total 1 0     Findings  RT: Mild edema localized to the right axilla; middle meatus and sphenoethmoidal recess are otherwise clear; moderate inferior turbinate hypertrophy; there is a nasal vestibular body present  LT: Middle meatus and sphenoethmoidal recess are clear there is moderate inferior turbinate hypertrophy; there is a nasal vestibular body present;     Low-anterior subcentimeter septal perforation    The nasopharynx is a scant amount of mucus which is suctioned using a straight suction    The patient tolerated the procedure well without complication.     Laboratory Review:  n/a    Imaging Review:  Reviewed CT sinus from today: There is mild mucosal thickening localized to the frontal ethmoid  region on the right.  He has bilateral inferior turbinate hypertrophy.  There is a septal perforation of the low anterior septum.    Pathology Review:  n/a    Assessment/Medical Decision Making:  Fco Robins is a 34-year-old gentleman with nasal obstruction owed to bilateral turbinate hypertrophy.    I think he is a candidate for bilateral inferior turbinate submucous resection.  I discussed all the risks benefits and alternatives of surgery in great detail with him.  I cautioned him that surgery may not help with snoring as well as his symptoms of mental fogginess.  He would like to think about surgery and will call back if he wishes to schedule.      Plan:  1. Donald is a candidate for bilateral IT SMR and ablation of bilateral nasal vestibular bodies.  I discussed with the risk benefits alternatives to surgery with him today.  He would like to think about surgery and he will call if he wishes to schedule in the future.  He is also having other evaluations for his symptoms including evaluation by sleep specialist as well as evaluation for seasonal allergies by an allergist.  I welcomed him to [place] back with me after he sees them if he would like to discuss surgery more detail again.  2. Follow-up with me pending his decision regarding surgery    Clint Zaman MD    [facility]  Rhinology  Endoscopic Skull Base Surgery  [facility]  Department of Otolaryngology - Head & Neck Surgery

## 2019-08-30 NOTE — TELEPHONE ENCOUNTER
"  1.  Reason for the visit:  Consult to discuss insomnia  2.  Referring provider and clinic name:  Dr. Donahue [facility]  3.  Previous Sleep Doctor or Pulmonlogist (clinic name)?  None noted  4.  Records, Procedures, Imaging, and Labs (see below)  No records to obtain        All NOTES from previous office visits that pertain to why they are being seen in the Sleep Center    Previous Sleep Studies, Chest CT, Echos and reports that pertain to why they are seeing Sleep Center    All Sleep records that have been done in the last 2 years that pertain to why they are seeing Sleep Center            Are they being seen for continuation of care for Cpap/Bipap/Avap/Trilogy/Dental Device? none    If yes to above Who and Where was Device issued/currently getting supplies from? na    Are you currently on \"Supplemental Oxygen\" during the day or night?   na                                                                                                                                                      Please remind pt to bring Cpap machine and ask to arrive 15 minutes early to appointment due traffic and congestion                                                 5. Pt Sleep Center Packet received Message left asking pt to arrive 30 minutes early to appointment if no packet received.        Yes: \"please make sure that you bring this to your appointment completed, either the doctor will not see you until this completed or you may be asked to reschedule your appointment.\"     No: mail or email to the pt and explain, \"please make sure that you bring this to your appointment completed, either the doctor will not see you until this completed or you may be asked to reschedule your appointment.\"     ~If pt coming early to fill packet out, ask that they come 30 minutes prior to their appointment~     6. Has the pt's medication list been updated and preferred pharmacy added?     7. Has the allergy list been " "reviewed?    \"Thank you for choosing [facility] and we look forward to seeing you at your upcoming appointment\"     "

## 2019-08-30 NOTE — LETTER
"8/30/2019       RE: Donald Robins  [address]  [address]  [facility] [address]     Dear Colleague,    Thank you for referring your patient, Donald Robins, to the [facility] EAR NOSE AND THROAT at [facility]. Please see a copy of my visit note below.                       [facility]                   New Patient Visit      Encounter date: August 30, 2019    Referring Provider:   Clint Sweeney MD  [address]  [place] [address]    Chief Complaint: nasal congestion; inferior turbinate hypertrophy    History of Present Illness: Donald Robins is a 34-year-old gentleman who comes to see me for several year history of nasal obstruction.  He is referred to me by Dr. Sweeney.  He has bilateral nasal congestion that alternates.  Today he states it is more severe on the right.  He had a septoplasty performed  several years ago she stated did not help with his nasal obstruction.  He has tried topical nasal steroid sprays for his nasal obstruction which did not help as well.  He denies significant nasal pruritus, drainage or frequent pain or pressure requiring antibiotics.  He describes his symptoms of \"sinusitis\"' which he describes more like headaches.  There is no associated purulent drainage.  He had a CT performed today which I reviewed.  He also has symptoms of mental fogginess and poor concentration.  He also relates to me that he has snoring with possible apneas at night.  He is pending evaluation by sleep specialist as well as allergist.    Review of systems: A 14-point review of systems has been conducted and was negative for any notable symptoms, except as dictated in the history of present illness.     Past Medical History:   Diagnosis Date     Tinnitus         Past Surgical History:   Procedure Laterality Date     SEPTOPLASTY      not helpful        Family History   Problem Relation Age of Onset     Diabetes Father      Anemia " Sister         Social History     Socioeconomic History     Marital status: Single     Spouse name: Not on file     Number of children: Not on file     Years of education: Not on file     Highest education level: Not on file   Occupational History     Not on file   Social Needs     Financial resource strain: Not on file     Food insecurity:     Worry: Not on file     Inability: Not on file     Transportation needs:     Medical: Not on file     Non-medical: Not on file   Tobacco Use     Smoking status: Never Smoker     Smokeless tobacco: Never Used   Substance and Sexual Activity     Alcohol use: Yes     Comment: social     Drug use: Never     Sexual activity: Yes     Partners: Female     Birth control/protection: Pull-out method, Condom   Lifestyle     Physical activity:     Days per week: Not on file     Minutes per session: Not on file     Stress: Not on file   Relationships     Social connections:     Talks on phone: Not on file     Gets together: Not on file     Attends Rastafarian service: Not on file     Active member of club or organization: Not on file     Attends meetings of clubs or organizations: Not on file     Relationship status: Not on file     Intimate partner violence:     Fear of current or ex partner: Not on file     Emotionally abused: Not on file     Physically abused: Not on file     Forced sexual activity: Not on file   Other Topics Concern     Not on file   Social History Narrative    [employer]; ; 5 yrs.              Physical Exam:  Vital signs: VSS  General Appearance: No acute distress, appropriate demeanor, conversant  Eyes: moist conjunctivae; EOMI; pupils symmetric; visual acuity grossly intact; no proptosis  Head: normocephalic; overall symmetric appearance without deformity  Face: overall symmetric without deformity; HB I-VI  Ears: Normal appearance of external ear; external meatus normal in appearance; TMs intact without perforation bilaterally;   Nose: No  external deformity; septum with small anterior perforation; inferior turbinates with significant hypertrophy; bilateral nasal vestibular bodies are present  Oral Cavity/oropharynx: Normal appearance of mucosa; tongue midline; no mass or lesions; tonsils 1+; oropharynx without obvious mucosal abnormality  Neck: no palpable lymphadenopathy; thyroid without palpable nodules  Lungs: symmetric chest rise; no wheezing  CV: Good distal perfusion; normal hear rate  Extremities: No deformity  Neurologic Exam: Cranial nerves II-XII are grossly intact; no focal deficit      Procedure Note  Procedure performed: Rigid nasal endoscopy  Indication: To evaluate for sinonasal pathology not visualized on routine anterior rhinoscopy  Anesthesia: 4% topical lidocaine with 0.05% oxymetazoline  Description of procedure: A 30 degree, 3 mm rigid endoscope was inserted into bilateral nasal cavities and the nasal valves, nasal cavity, middle meatus, sphenoethmoid recess, and nasopharynx were thoroughly evaluated for evidence of obstruction, edema, purulence, polyps and/or mass/lesion.     [place]-Gustavo Endoscopic Scoring System  Endoscopic observation Right Left   Polyps in middle meatus (0 = absent, 1 = restricted to middle meatus, 2 = Beyond middle meatus) 0 0   Discharge (0 = absent, 1 = thin and clear, 2 = thick, purulent) 0 0   Edema (0 = absent, 1 = mild-moderate, 2 = moderate-severe) 1 0   Crusting (0 = absent, 1 = mild-moderate, 2 = moderate-severe) 0 0   Scarring (0= absent, 1 = mild-moderate, 2 = moderate-severe) 0 0   Total 1 0     Findings  RT: Mild edema localized to the right axilla; middle meatus and sphenoethmoidal recess are otherwise clear; moderate inferior turbinate hypertrophy; there is a nasal vestibular body present  LT: Middle meatus and sphenoethmoidal recess are clear there is moderate inferior turbinate hypertrophy; there is a nasal vestibular body present;     Low-anterior subcentimeter septal perforation    The  nasopharynx is a scant amount of mucus which is suctioned using a straight suction    The patient tolerated the procedure well without complication.     Laboratory Review:  n/a    Imaging Review:  Reviewed CT sinus from today: There is mild mucosal thickening localized to the frontal ethmoid region on the right.  He has bilateral inferior turbinate hypertrophy.  There is a septal perforation of the low anterior septum.    Pathology Review:  n/a    Assessment/Medical Decision Making:  Fco Robins is a 34-year-old gentleman with nasal obstruction owed to bilateral turbinate hypertrophy.    I think he is a candidate for bilateral inferior turbinate submucous resection.  I discussed all the risks benefits and alternatives of surgery in great detail with him.  I cautioned him that surgery may not help with snoring as well as his symptoms of mental fogginess.  He would like to think about surgery and will call back if he wishes to schedule.      Plan:  1. Donald is a candidate for bilateral IT SMR and ablation of bilateral nasal vestibular bodies.  I discussed with the risk benefits alternatives to surgery with him today.  He would like to think about surgery and he will call if he wishes to schedule in the future.  He is also having other evaluations for his symptoms including evaluation by sleep specialist as well as evaluation for seasonal allergies by an allergist.  I welcomed him to [place] back with me after he sees them if he would like to discuss surgery more detail again.  2. Follow-up with me pending his decision regarding surgery    Clint Zaman MD    [facility]  Rhinology  Endoscopic Skull Base Surgery  [facility]  Department of Otolaryngology - Head & Neck Surgery        Again, thank you for allowing me to participate in the care of your patient.      Sincerely,    Clint Zaman MD

## 2019-08-30 NOTE — NURSING NOTE
Chief Complaint   Patient presents with     Consult     Turbinate hypertrophy     Blood pressure 124/66, pulse 76, weight 94.8 kg (209 lb).    Batool Matias EMT

## 2019-09-03 ENCOUNTER — MYC MEDICAL ADVICE (OUTPATIENT)
Dept: OTOLARYNGOLOGY | Facility: CLINIC | Age: 34
End: 2019-09-03

## 2019-10-21 ENCOUNTER — PREP FOR PROCEDURE (OUTPATIENT)
Dept: OTOLARYNGOLOGY | Facility: CLINIC | Age: 34
End: 2019-10-21

## 2019-10-21 ENCOUNTER — PATIENT OUTREACH (OUTPATIENT)
Dept: OTOLARYNGOLOGY | Facility: CLINIC | Age: 34
End: 2019-10-21

## 2019-10-21 DIAGNOSIS — J34.3 HYPERTROPHY OF BOTH INFERIOR NASAL TURBINATES: Primary | ICD-10-CM

## 2019-10-21 DIAGNOSIS — J34.89 NASAL OBSTRUCTION: ICD-10-CM

## 2019-10-21 NOTE — PROGRESS NOTES
RN calling patient back to discuss surgical plans. Patient reports that he has suddenly been required to move and would like to schedule surgery with Dr. Zaman as soon as possible as he is scheduled to move this Friday 10/25/2019. Patient reports he would like to try and stay to have surgery next Monday 10/28/2019. Patient is unsure if his insurance will be active as he is currently insured through his employer. RN advised patient to contact his employer to find out when his coverage is set to end. RN requests patient call RN directly with how he would like to proceed. Patient reports he will call back tomorrow 10/22/2019 with an update. RN reviewed pre-surgical requirements with patient should he proceed with surgery. RN reviewed the following information:     Pre-op reminders:    1. Complete pre-op H+P within 30 days of surgery (recommend pre-op appointment 2 weeks prior to surgery date).   2.  needed on day of surgery.   3.Discuss all medications, including blood-thinning medications with PCP at pre-op appointment (Coumadin, Plavix, Aspirin, Ibuprofen/Motrin, Vitamin E and Fish Oil), which may need to be discontinued 7 days prior to surgery date.   4. Nothing to eat or drink after midnight the night before surgery.   5. Take shower or bath the morning of surgery and remove deodorant, cologne, scented lotion, makeup, nail polish and jewelry.     RN informed patient that a referral for PAC can be entered to complete his pre-op history in physical prior. Patient is in agreement with this. Patient is in agreement with the current plan and denies any further questions or concerns at this time. RN provided patient with direct contact information.     Savannah Bell RN

## 2019-10-22 ENCOUNTER — PREP FOR PROCEDURE (OUTPATIENT)
Dept: OTOLARYNGOLOGY | Facility: CLINIC | Age: 34
End: 2019-10-22

## 2019-10-22 ENCOUNTER — TELEPHONE (OUTPATIENT)
Dept: OTOLARYNGOLOGY | Facility: CLINIC | Age: 34
End: 2019-10-22

## 2019-10-22 ENCOUNTER — PRE VISIT (OUTPATIENT)
Dept: SURGERY | Facility: CLINIC | Age: 34
End: 2019-10-22

## 2019-10-22 PROBLEM — J34.89 NASAL OBSTRUCTION: Status: ACTIVE | Noted: 2019-10-22

## 2019-10-22 PROBLEM — J34.3 HYPERTROPHY OF BOTH INFERIOR NASAL TURBINATES: Status: ACTIVE | Noted: 2019-10-22

## 2019-10-22 NOTE — PROGRESS NOTES
Patient calling to confirm that he would like to move forward with surgery on 10/28/2019. Patient has been scheduled with PAC on 10/24/2019. Pre-surgery teaching has been completed. Patient to call RN should he have any further questions or concerns.     Savannah Bell RN

## 2019-10-22 NOTE — TELEPHONE ENCOUNTER
Patient called back discussing surgery plans. Notified me that he is flying into [place] from [place] the day before surgery 10/28 and would like to leave 'sooner than later.'     Patient has additional questions in regards to when he can fly and what time surgery is scheduled.       Will relay information to Savannah accordingly.       LYDIA 10/22/2019

## 2019-10-22 NOTE — TELEPHONE ENCOUNTER
FUTURE VISIT INFORMATION      SURGERY INFORMATION:    PAC eval/Anesthesia Consult, No history or cardiac or pulm issues - per pt.Surgery: bilateral Inferior turbinate submucous resection SMR ablation of bilateral nasal vestibular bodies, Surgeon: Dr. Zaman,Surgery to be scheduled for 10/28     RECORDS REQUESTED FROM:       Primary Care Provider: Brooklyn Bach

## 2019-10-22 NOTE — TELEPHONE ENCOUNTER
Per Dr. Zaman, patient may be seen 10/30 for post-operative visit. RN informed patient that this has been approved, but it is recommended that he wait until one week post operatively to be evaluated prior to moving. Patient expressed understanding of this recommendation. Patient has been scheduled for a post-op visit with Dr. Zaman on 10/30 per patient. RN confirmed with patient pre-operative requirements again. RN confirmed that patient has arranged for a responsible adult to drive him after surgery as well as remain with him for 24 hours. Patient reports this has been arranged with his sister. Patient denies any further questions or concerns at this time.     Savannah Bell RN

## 2019-10-22 NOTE — TELEPHONE ENCOUNTER
RN calling patient to discuss post-operative plans. Patient states he is leaving [place] and flying back to have surgery. RN informed patient that per Dr. Zaman patient may be seen earlier for post-op on 11/01. Patient states he will attempt to move his plans around for this. RN will discuss this with Dr. Zaman when he is in clinic tomorrow for any alternative recommendations. RN informed patient that surgery may be cancelled pending post-op coordination. Patient expressed understanding. Patient denies any further questions or concerns at this time.     Savannah Bell, RN

## 2019-10-24 ENCOUNTER — ANESTHESIA EVENT (OUTPATIENT)
Dept: SURGERY | Facility: AMBULATORY SURGERY CENTER | Age: 34
End: 2019-10-24

## 2019-10-24 ENCOUNTER — OFFICE VISIT (OUTPATIENT)
Dept: SURGERY | Facility: CLINIC | Age: 34
End: 2019-10-24
Payer: COMMERCIAL

## 2019-10-24 VITALS
WEIGHT: 211.1 LBS | RESPIRATION RATE: 19 BRPM | DIASTOLIC BLOOD PRESSURE: 71 MMHG | SYSTOLIC BLOOD PRESSURE: 115 MMHG | HEART RATE: 65 BPM | BODY MASS INDEX: 30.22 KG/M2 | HEIGHT: 70 IN | TEMPERATURE: 98.5 F | OXYGEN SATURATION: 97 %

## 2019-10-24 DIAGNOSIS — Z01.818 PREOP EXAMINATION: Primary | ICD-10-CM

## 2019-10-24 ASSESSMENT — MIFFLIN-ST. JEOR: SCORE: 1913.79

## 2019-10-24 ASSESSMENT — PAIN SCALES - GENERAL: PAINLEVEL: NO PAIN (0)

## 2019-10-24 ASSESSMENT — LIFESTYLE VARIABLES: TOBACCO_USE: 0

## 2019-10-24 NOTE — H&P
Pre-Operative H & P     CC:  Preoperative exam to assess for increased cardiopulmonary risk while undergoing surgery and anesthesia.    Date of Encounter: 10/24/2019  Primary Care Physician:  Brooklyn Matias  Reason for visit: Hypertrophy of both inferior nasal turbinates [J34.3] Nasal obstruction [J34.89]  HPI  Doanld Robins is a 34 year old male who presents for pre-operative H & P in preparation for bilateral endoscopic inferior turbinate submucous resection and ablation of bilateral nasal vestibular bodies with Dr. Zaman on 10/28/19 at [facility]. History is obtained from the patient.     Patient who was recently evaluated by Dr. Zaman for history of nasal obstruction for several years, typically more severe on right. He is s/p septoplasty years ago which did not relieve his symptoms. He has also tried nasal steroid sprays with relief. He reports headaches, mental fogginess and poor concentration. He does snore and is being evaluated by an allergiest and sleep specialist also.  A CT was obtained showing mild mucosal thickening localized to the frontal ethmoid region on the right.  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 He was counseled for above procedure.     Patient's history is otherwise significant for tinnitus      Past Medical History  Past Medical History:   Diagnosis Date     Hypertrophy of both inferior nasal turbinates 10/22/2019     Nasal congestion 8/19/2019     Tinnitus        Past Surgical History  Past Surgical History:   Procedure Laterality Date     SEPTOPLASTY      not helpful     [place] teeth extraction         Hx of Blood transfusions/reactions: Denies.      Hx of abnormal bleeding or anti-platelet use: Denies    Steroid use in the last year: Denies.     Personal or FH with difficulty with Anesthesia:  Denies.    Prior to Admission Medications  Current Outpatient Medications   Medication Sig Dispense  Refill     ranitidine (ZANTAC) 150 MG tablet Take 150 mg by mouth as needed for heartburn (Pt. last took 10/22/19)         Allergies  Allergies   Allergen Reactions     Sulfa Drugs Rash       Social History  Social History     Socioeconomic History     Marital status: Single     Spouse name: Not on file     Number of children: Not on file     Years of education: Not on file     Highest education level: Not on file   Occupational History     Not on file   Social Needs     Financial resource strain: Not on file     Food insecurity:     Worry: Not on file     Inability: Not on file     Transportation needs:     Medical: Not on file     Non-medical: Not on file   Tobacco Use     Smoking status: Never Smoker     Smokeless tobacco: Never Used   Substance and Sexual Activity     Alcohol use: Yes     Comment: social     Drug use: Never     Sexual activity: Yes     Partners: Female     Birth control/protection: Pull-out method, Condom   Lifestyle     Physical activity:     Days per week: Not on file     Minutes per session: Not on file     Stress: Not on file   Relationships     Social connections:     Talks on phone: Not on file     Gets together: Not on file     Attends Zoroastrianism service: Not on file     Active member of club or organization: Not on file     Attends meetings of clubs or organizations: Not on file     Relationship status: Not on file     Intimate partner violence:     Fear of current or ex partner: Not on file     Emotionally abused: Not on file     Physically abused: Not on file     Forced sexual activity: Not on file   Other Topics Concern     Not on file   Social History Narrative    [employer]; ; 5 yrs.             Family History  Family History   Problem Relation Age of Onset     Diabetes Father      Anemia Sister        Review of Systems  ROS/MED HX  The complete review of systems is negative other than noted in the HPI or here.   ENT/Pulmonary:     (+)other ENT- nasal  obstruction, , . .   (-) tobacco use   Neurologic:  - neg neurologic ROS     Cardiovascular:  - neg cardiovascular ROS   (+) ----. : . . . :. . No previous cardiac testing       METS/Exercise Tolerance:  >4 METS   Hematologic:  - neg hematologic  ROS       Musculoskeletal:  - neg musculoskeletal ROS       GI/Hepatic:  - neg GI/hepatic ROS       Renal/Genitourinary:  - ROS Renal section negative       Endo:  - neg endo ROS       Psychiatric:  - neg psychiatric ROS       Infectious Disease:  - neg infectious disease ROS       Malignancy:      - no malignancy   Other:    (+) No chance of pregnancy C-spine cleared: N/A, no H/O Chronic Pain,no other significant disability              PHYSICAL EXAM:   Mental Status/Neuro: A/A/O; Age Appropriate   Airway: Facies: Feasible  Mallampati: I  Mouth/Opening: Full  TM distance: > 6 cm  Neck ROM: Full   Respiratory: Auscultation: CTAB     Resp. Rate: Normal     Resp. Effort: Normal      CV: Rhythm: Regular  Heart: Normal Sounds  Edema: None   Comments:      Dental: Normal Dentition            Personally reviewed  LABS:  CBC:   Lab Results   Component Value Date    WBC 7.2 08/19/2019    HGB 14.9 08/19/2019    HCT 43.9 08/19/2019     08/19/2019     BMP:   Lab Results   Component Value Date     08/19/2019    POTASSIUM 4.3 08/19/2019    CHLORIDE 107 08/19/2019    CO2 24 08/19/2019    BUN 15 08/19/2019    CR 1.15 08/19/2019    GLC 98 08/19/2019     COAGS: No results found for: PTT, INR, FIBR  POC: No results found for: BGM, HCG, HCGS  OTHER:   Lab Results   Component Value Date    LUIS F 9.1 08/19/2019    ALBUMIN 4.4 08/19/2019    PROTTOTAL 7.6 08/19/2019    ALT 26 08/19/2019    AST 25 08/19/2019    ALKPHOS 62 08/19/2019    BILITOTAL 0.7 08/19/2019    TSH 2.58 08/19/2019        Preop Vitals    BP Readings from Last 3 Encounters:   10/24/19 115/71   08/30/19 124/66   08/23/19 108/71    Pulse Readings from Last 3 Encounters:   10/24/19 65   08/30/19 76   08/23/19 70      Resp  "Readings from Last 3 Encounters:   10/24/19 19   08/19/19 20    SpO2 Readings from Last 3 Encounters:   10/24/19 97%   08/19/19 97%   04/16/19 98%      Temp Readings from Last 1 Encounters:   10/24/19 98.5  F (36.9  C) (Oral)    Ht Readings from Last 1 Encounters:   10/24/19 1.778 m (5' 10\")      Wt Readings from Last 1 Encounters:   10/24/19 95.8 kg (211 lb 1.6 oz)    Estimated body mass index is 30.29 kg/m  as calculated from the following:    Height as of this encounter: 1.778 m (5' 10\").    Weight as of this encounter: 95.8 kg (211 lb 1.6 oz).     Temp: 98.5  F (36.9  C) Temp src: Oral BP: 115/71 Pulse: 65   Resp: 19 SpO2: 97 %         211 lbs 1.6 oz  5' 10\"   Body mass index is 30.29 kg/m .       Physical Exam  Constitutional: Awake, alert, cooperative, no apparent distress, and appears stated age.  Eyes: Pupils equal, round and reactive to light, extra ocular muscles intact, sclera clear, conjunctiva normal.  HENT: Normocephalic, oral pharynx with moist mucus membranes, good dentition. No goiter appreciated.   Respiratory: Clear to auscultation bilaterally, no crackles or wheezing. No cough or obvious dyspnea.  Cardiovascular: Regular rate and rhythm, normal S1 and S2, and no murmur noted.  Carotids +2, no bruits. No edema. Palpable pulses to radial  DP and PT arteries.   GI: Normal bowel sounds, soft, non-distended, non-tender, no masses palpated, no hepatosplenomegaly.    Lymph/Hematologic: No cervical lymphadenopathy and no supraclavicular lymphadenopathy.  Genitourinary:  Deferred.   Skin: Warm and dry.  No rashes at anticipated surgical site.   Musculoskeletal: Full ROM of neck. There is no redness, warmth, or swelling of the joints. Gross motor strength is normal.    Neurologic: Awake, alert, oriented to name, place and time. Cranial nerves II-XII are grossly intact. Gait is normal.   Neuropsychiatric: Calm, cooperative. Normal affect.     EKG Not indicated.  MRA Brain 4/24/19  Impression:  Head MRA " demonstrates no definite aneurysm or stenosis of the major  intracranial arteries.    CT Sinus 8/30/19                                                                   Impression:   No air-fluid levels to suggest acute sinusitis. Mild  right frontoethmoid mucosal thickening, improved since the MRI  4/24/2019.    Imaging reviewed by this provider      Outside records reviewed from: Care Everywhere    ASSESSMENT and PLAN  Donald Robins is a 34 year old male scheduled to undergo bilateral endoscopic inferior turbinate submucous resection and ablation of bilateral nasal vestibular bodies with Dr. Zaman on 10/28/19. He has the following specific operative considerations:   - RCRI : No serious cardiac risks.    - Anesthesia considerations:  Refer to PAC assessment in anesthesia records  - VTE risk: 0.5%  - DARON # of risks 2/8 = Low risk  - Risk of PONV score = 2.  If > 2, anti-emetic intervention recommended.    --Nasal obstruction with above procedures now planned.   --Generally healthy male with no significant cardiopulmonary history. Nonsmoker. Good activity tolerance.     Arrival time, NPO, shower and medication instructions provided by nursing staff today. Preparing For Your Surgery handout given.    Patient is optimized and is acceptable candidate for the proposed procedure.  No further diagnostic evaluation is needed.     Kiara Zimmerman, ROBINSON CNS  Preoperative Assessment Center  [facility] and [facility]  Phone: [phone]  Fax: [phone]

## 2019-10-24 NOTE — PATIENT INSTRUCTIONS
Preparing for Your Surgery      Name:  Donald Robins   MRN:  [id number]   :  1985   Today's Date:  10/24/2019     Arriving for surgery:  Surgery date:  10/28/19  Arrival time:  7:50AM  Please come to:     [facility]  [address] [place] [id number]     Parking is available in front of the Clinics and Surgery Center building from 5:30AM to 8:00PM.  -  Proceed to the 5th floor to check into the Ambulatory Surgery Center.              >> There will be patient concierges on the 1st and 5th floor, for assistance or an escort, if you would like.              >> Please call [phone] with any questions.    What can I eat or drink?   -  You may have solid food or milk products until 8 hours prior to your surgery. 10/28/19, 1:20AM  -  You may have water, apple juice or 7up/Sprite until 2 hours prior to your surgery. 10/28/19, 7:20AM    Which medicines can I take?  Stop Aspirin, vitamins and supplements one week prior to surgery.  Hold Ibuprofen for 24 hours and/or Naproxen for 48 hours prior to surgery.     -  Please take these medications the day of surgery:    Ranitidine(Zantac) as needed    How do I prepare myself?  -  Take two showers: one the night before surgery; and one the morning of surgery.         Use Scrubcare or Hibiclens to wash from neck down, leave soap on your skin for up to one minute.  Do not get soap in your eyes or ears.  You may use your own shampoo and conditioner; no other hair products.   -  Do NOT use lotion, powder, deodorant, or antiperspirant the day of your surgery.  -  Do NOT wear jewelry.  - Do not bring your own medications to the hospital, except for inhalers and eye   drops.  -  Bring your ID and insurance card.    -If you are scheduled to go home the Same Day as surgery you must have a responsible adult as a  and to stay with you overnight the first 24 hours after surgery.     Questions or Concerns:  -If you are scheduled at the  Ambulatory Surgery Center and have questions or concerns regarding the day of surgery please call [phone].    -For questions after surgery please call your surgeons office.

## 2019-10-24 NOTE — ANESTHESIA PREPROCEDURE EVALUATION
Anesthesia Pre-Procedure Evaluation    Patient: Donald Robins   MRN:     [id number] Gender:   male   Age:    34 year old :      1985        Preoperative Diagnosis: Hypertrophy of both inferior nasal turbinates [J34.3]  Nasal obstruction [J34.89]   Procedure(s):  bilateral Inferior turbinate submucous resection ablation of bilateral nasal vestibular bodies.     Past Medical History:   Diagnosis Date     Hypertrophy of both inferior nasal turbinates 10/22/2019    Added automatically from request for surgery [id number]     Nasal congestion 2019     Tinnitus       Past Surgical History:   Procedure Laterality Date     SEPTOPLASTY      not helpful          Anesthesia Evaluation     . Pt has had prior anesthetic. Type: General and MAC    No history of anesthetic complications          ROS/MED HX    ENT/Pulmonary:     (+)other ENT- nasal obstruction, , . .   (-) tobacco use   Neurologic:  - neg neurologic ROS     Cardiovascular:  - neg cardiovascular ROS   (+) ----. : . . . :. . No previous cardiac testing       METS/Exercise Tolerance:  >4 METS   Hematologic:  - neg hematologic  ROS       Musculoskeletal:  - neg musculoskeletal ROS       GI/Hepatic:  - neg GI/hepatic ROS       Renal/Genitourinary:  - ROS Renal section negative       Endo:  - neg endo ROS       Psychiatric:  - neg psychiatric ROS       Infectious Disease:  - neg infectious disease ROS       Malignancy:      - no malignancy   Other:    (+) No chance of pregnancy C-spine cleared: N/A, no H/O Chronic Pain,no other significant disability                        PHYSICAL EXAM:   Mental Status/Neuro: A/A/O; Age Appropriate   Airway: Facies: Feasible  Mallampati: I  Mouth/Opening: Full  TM distance: > 6 cm  Neck ROM: Full   Respiratory: Auscultation: CTAB     Resp. Rate: Normal     Resp. Effort: Normal      CV: Rhythm: Regular  Heart: Normal Sounds  Edema: None   Comments:      Dental: Normal Dentition                LABS:  CBC:   Lab Results   Component  "Value Date    WBC 7.2 08/19/2019    HGB 14.9 08/19/2019    HCT 43.9 08/19/2019     08/19/2019     BMP:   Lab Results   Component Value Date     08/19/2019    POTASSIUM 4.3 08/19/2019    CHLORIDE 107 08/19/2019    CO2 24 08/19/2019    BUN 15 08/19/2019    CR 1.15 08/19/2019    GLC 98 08/19/2019     COAGS: No results found for: PTT, INR, FIBR  POC: No results found for: BGM, HCG, HCGS  OTHER:   Lab Results   Component Value Date    LUIS F 9.1 08/19/2019    ALBUMIN 4.4 08/19/2019    PROTTOTAL 7.6 08/19/2019    ALT 26 08/19/2019    AST 25 08/19/2019    ALKPHOS 62 08/19/2019    BILITOTAL 0.7 08/19/2019    TSH 2.58 08/19/2019        Preop Vitals    BP Readings from Last 3 Encounters:   10/24/19 115/71   08/30/19 124/66   08/23/19 108/71    Pulse Readings from Last 3 Encounters:   10/24/19 65   08/30/19 76   08/23/19 70      Resp Readings from Last 3 Encounters:   10/24/19 19   08/19/19 20    SpO2 Readings from Last 3 Encounters:   10/24/19 97%   08/19/19 97%   04/16/19 98%      Temp Readings from Last 1 Encounters:   10/24/19 98.5  F (36.9  C) (Oral)    Ht Readings from Last 1 Encounters:   10/24/19 1.778 m (5' 10\")      Wt Readings from Last 1 Encounters:   10/24/19 95.8 kg (211 lb 1.6 oz)    Estimated body mass index is 30.29 kg/m  as calculated from the following:    Height as of this encounter: 1.778 m (5' 10\").    Weight as of this encounter: 95.8 kg (211 lb 1.6 oz).     LDA:        Assessment:   ASA SCORE: 1    H&P: History and physical reviewed and following examination; no interval change.   Smoking Status:  Non-Smoker/Unknown   NPO Status: NPO Appropriate     Plan:   Anes. Type:  General   Pre-Medication: None   Induction:  IV (Standard)   Airway: ETT; Oral   Access/Monitoring: PIV   Maintenance: TIVA     Postop Plan:   Postop Pain: Opioids  Postop Sedation/Airway: Not planned  Disposition: Outpatient     PONV Management:   Adult Risk Factors:, Non-Smoker, Postop Opioids   Prevention: Ondansetron, " Dexamethasone, No Volatiles     CONSENT: Direct conversation   Plan and risks discussed with: Patient   Blood Products: Consent Deferred (Minimal Blood Loss)                PAC Discussion and Assessment    ASA Classification: 1  Case is suitable for: ASC  Anesthetic techniques and relevant risks discussed: GA  Invasive monitoring and risk discussed: No  Types:   Possibility and Risk of blood transfusion discussed: No  NPO instructions given:   Additional anesthetic preparation and risks discussed:   Needs early admission to pre-op area:   Other:     PAC Resident/NP Anesthesia Assessment:  Donald Robins is a 34 year old male scheduled to undergo bilateral endoscopic inferior turbinate submucous resection and ablation of bilateral nasal vestibular bodies with Dr. Zaman on 10/28/19. He has the following specific operative considerations:   - RCRI : No serious cardiac risks.    - VTE risk: 0.5%  - DARON # of risks 2/8 = Low risk  - Risk of PONV score = 2.  If > 2, anti-emetic intervention recommended.    --Nasal obstruction with above procedures now planned.   --Generally healthy male with no significant cardiopulmonary history. Nonsmoker. Good activity tolerance.         Patient is optimized and is acceptable candidate for the proposed procedure.  No further diagnostic evaluation is needed.         Reviewed and Signed by PAC Mid-Level Provider/Resident  Mid-Level Provider/Resident: ROBINSON Stark, NAZIA  Date: 10/24/19  Time: 3:47pm    Attending Anesthesiologist Anesthesia Assessment:        Anesthesiologist:   Date:   Time:   Pass/Fail:   Disposition:     PAC Pharmacist Assessment:        Pharmacist:   Date:   Time:    ROBINSON Acosta

## 2019-10-28 ENCOUNTER — HOSPITAL ENCOUNTER (OUTPATIENT)
Facility: AMBULATORY SURGERY CENTER | Age: 34
End: 2019-10-28
Attending: OTOLARYNGOLOGY
Payer: COMMERCIAL

## 2019-10-28 ENCOUNTER — ANESTHESIA (OUTPATIENT)
Dept: SURGERY | Facility: AMBULATORY SURGERY CENTER | Age: 34
End: 2019-10-28

## 2019-10-28 VITALS
RESPIRATION RATE: 16 BRPM | WEIGHT: 211 LBS | HEIGHT: 70 IN | TEMPERATURE: 98.6 F | BODY MASS INDEX: 30.21 KG/M2 | OXYGEN SATURATION: 96 % | DIASTOLIC BLOOD PRESSURE: 98 MMHG | SYSTOLIC BLOOD PRESSURE: 145 MMHG | HEART RATE: 105 BPM

## 2019-10-28 DIAGNOSIS — J34.89 NASAL OBSTRUCTION: ICD-10-CM

## 2019-10-28 DIAGNOSIS — J34.3 HYPERTROPHY OF BOTH INFERIOR NASAL TURBINATES: ICD-10-CM

## 2019-10-28 DIAGNOSIS — R09.81 NASAL CONGESTION: Primary | ICD-10-CM

## 2019-10-28 RX ORDER — FENTANYL CITRATE 50 UG/ML
INJECTION, SOLUTION INTRAMUSCULAR; INTRAVENOUS PRN
Status: DISCONTINUED | OUTPATIENT
Start: 2019-10-28 | End: 2019-10-28

## 2019-10-28 RX ORDER — ONDANSETRON 2 MG/ML
4 INJECTION INTRAMUSCULAR; INTRAVENOUS EVERY 30 MIN PRN
Status: DISCONTINUED | OUTPATIENT
Start: 2019-10-28 | End: 2019-10-29 | Stop reason: HOSPADM

## 2019-10-28 RX ORDER — ACETAMINOPHEN 325 MG/1
975 TABLET ORAL ONCE
Status: COMPLETED | OUTPATIENT
Start: 2019-10-28 | End: 2019-10-28

## 2019-10-28 RX ORDER — SODIUM CHLORIDE, SODIUM LACTATE, POTASSIUM CHLORIDE, CALCIUM CHLORIDE 600; 310; 30; 20 MG/100ML; MG/100ML; MG/100ML; MG/100ML
INJECTION, SOLUTION INTRAVENOUS CONTINUOUS
Status: DISCONTINUED | OUTPATIENT
Start: 2019-10-28 | End: 2019-10-29 | Stop reason: HOSPADM

## 2019-10-28 RX ORDER — LIDOCAINE HYDROCHLORIDE AND EPINEPHRINE 10; 10 MG/ML; UG/ML
INJECTION, SOLUTION INFILTRATION; PERINEURAL PRN
Status: DISCONTINUED | OUTPATIENT
Start: 2019-10-28 | End: 2019-10-28 | Stop reason: HOSPADM

## 2019-10-28 RX ORDER — ONDANSETRON 4 MG/1
4 TABLET, ORALLY DISINTEGRATING ORAL EVERY 30 MIN PRN
Status: DISCONTINUED | OUTPATIENT
Start: 2019-10-28 | End: 2019-10-29 | Stop reason: HOSPADM

## 2019-10-28 RX ORDER — PROPOFOL 10 MG/ML
INJECTION, EMULSION INTRAVENOUS PRN
Status: DISCONTINUED | OUTPATIENT
Start: 2019-10-28 | End: 2019-10-28

## 2019-10-28 RX ORDER — GABAPENTIN 300 MG/1
300 CAPSULE ORAL ONCE
Status: COMPLETED | OUTPATIENT
Start: 2019-10-28 | End: 2019-10-28

## 2019-10-28 RX ORDER — OXYCODONE HYDROCHLORIDE 5 MG/1
5 TABLET ORAL EVERY 4 HOURS PRN
Status: DISCONTINUED | OUTPATIENT
Start: 2019-10-28 | End: 2019-10-29 | Stop reason: HOSPADM

## 2019-10-28 RX ORDER — ONDANSETRON 2 MG/ML
INJECTION INTRAMUSCULAR; INTRAVENOUS PRN
Status: DISCONTINUED | OUTPATIENT
Start: 2019-10-28 | End: 2019-10-28

## 2019-10-28 RX ORDER — PROPOFOL 10 MG/ML
INJECTION, EMULSION INTRAVENOUS CONTINUOUS PRN
Status: DISCONTINUED | OUTPATIENT
Start: 2019-10-28 | End: 2019-10-28

## 2019-10-28 RX ORDER — NALOXONE HYDROCHLORIDE 0.4 MG/ML
.1-.4 INJECTION, SOLUTION INTRAMUSCULAR; INTRAVENOUS; SUBCUTANEOUS
Status: DISCONTINUED | OUTPATIENT
Start: 2019-10-28 | End: 2019-10-29 | Stop reason: HOSPADM

## 2019-10-28 RX ORDER — FENTANYL CITRATE 50 UG/ML
25-50 INJECTION, SOLUTION INTRAMUSCULAR; INTRAVENOUS
Status: DISCONTINUED | OUTPATIENT
Start: 2019-10-28 | End: 2019-10-29 | Stop reason: HOSPADM

## 2019-10-28 RX ORDER — LIDOCAINE HYDROCHLORIDE 20 MG/ML
INJECTION, SOLUTION INFILTRATION; PERINEURAL PRN
Status: DISCONTINUED | OUTPATIENT
Start: 2019-10-28 | End: 2019-10-28

## 2019-10-28 RX ORDER — OXYCODONE HYDROCHLORIDE 5 MG/1
5 TABLET ORAL EVERY 6 HOURS PRN
Qty: 10 TABLET | Refills: 0 | Status: SHIPPED | OUTPATIENT
Start: 2019-10-28 | End: 2019-10-31

## 2019-10-28 RX ORDER — CEFAZOLIN SODIUM 2 G/50ML
2 SOLUTION INTRAVENOUS ONCE
Status: COMPLETED | OUTPATIENT
Start: 2019-10-28 | End: 2019-10-28

## 2019-10-28 RX ORDER — MEPERIDINE HYDROCHLORIDE 25 MG/ML
12.5 INJECTION INTRAMUSCULAR; INTRAVENOUS; SUBCUTANEOUS
Status: DISCONTINUED | OUTPATIENT
Start: 2019-10-28 | End: 2019-10-29 | Stop reason: HOSPADM

## 2019-10-28 RX ORDER — OXYMETAZOLINE HYDROCHLORIDE 0.05 G/100ML
2 SPRAY NASAL 2 TIMES DAILY
Status: DISCONTINUED | OUTPATIENT
Start: 2019-10-28 | End: 2019-10-29 | Stop reason: HOSPADM

## 2019-10-28 RX ORDER — DEXAMETHASONE SODIUM PHOSPHATE 4 MG/ML
INJECTION, SOLUTION INTRA-ARTICULAR; INTRALESIONAL; INTRAMUSCULAR; INTRAVENOUS; SOFT TISSUE PRN
Status: DISCONTINUED | OUTPATIENT
Start: 2019-10-28 | End: 2019-10-28

## 2019-10-28 RX ADMIN — PROPOFOL: 10 INJECTION, EMULSION INTRAVENOUS at 09:06

## 2019-10-28 RX ADMIN — PROPOFOL 50 MG: 10 INJECTION, EMULSION INTRAVENOUS at 09:28

## 2019-10-28 RX ADMIN — PROPOFOL 250 MG: 10 INJECTION, EMULSION INTRAVENOUS at 08:35

## 2019-10-28 RX ADMIN — GABAPENTIN 300 MG: 300 CAPSULE ORAL at 07:53

## 2019-10-28 RX ADMIN — DEXAMETHASONE SODIUM PHOSPHATE 8 MG: 4 INJECTION, SOLUTION INTRA-ARTICULAR; INTRALESIONAL; INTRAMUSCULAR; INTRAVENOUS; SOFT TISSUE at 08:35

## 2019-10-28 RX ADMIN — ONDANSETRON 4 MG: 2 INJECTION INTRAMUSCULAR; INTRAVENOUS at 08:02

## 2019-10-28 RX ADMIN — Medication 0.5 MG: at 09:35

## 2019-10-28 RX ADMIN — OXYCODONE HYDROCHLORIDE 5 MG: 5 TABLET ORAL at 10:10

## 2019-10-28 RX ADMIN — PROPOFOL 150 MCG/KG/MIN: 10 INJECTION, EMULSION INTRAVENOUS at 08:35

## 2019-10-28 RX ADMIN — LIDOCAINE HYDROCHLORIDE 60 MG: 20 INJECTION, SOLUTION INFILTRATION; PERINEURAL at 08:35

## 2019-10-28 RX ADMIN — CEFAZOLIN SODIUM 2 G: 2 SOLUTION INTRAVENOUS at 08:45

## 2019-10-28 RX ADMIN — FENTANYL CITRATE 50 MCG: 50 INJECTION, SOLUTION INTRAMUSCULAR; INTRAVENOUS at 08:31

## 2019-10-28 RX ADMIN — SODIUM CHLORIDE, SODIUM LACTATE, POTASSIUM CHLORIDE, CALCIUM CHLORIDE: 600; 310; 30; 20 INJECTION, SOLUTION INTRAVENOUS at 08:26

## 2019-10-28 RX ADMIN — ACETAMINOPHEN 975 MG: 325 TABLET ORAL at 07:53

## 2019-10-28 RX ADMIN — FENTANYL CITRATE 50 MCG: 50 INJECTION, SOLUTION INTRAMUSCULAR; INTRAVENOUS at 08:35

## 2019-10-28 RX ADMIN — FENTANYL CITRATE 100 MCG: 50 INJECTION, SOLUTION INTRAMUSCULAR; INTRAVENOUS at 08:50

## 2019-10-28 ASSESSMENT — MIFFLIN-ST. JEOR: SCORE: 1913.34

## 2019-10-28 NOTE — ANESTHESIA POSTPROCEDURE EVALUATION
Anesthesia POST Procedure Evaluation    Patient: Donald Robins   MRN:     [id number] Gender:   male   Age:    34 year old :      1985        Preoperative Diagnosis: Hypertrophy of both inferior nasal turbinates [J34.3]  Nasal obstruction [J34.89]   Procedure(s):  Bilateral Endoscopic Inferior Turbinate Submucous Resection   Postop Comments: No value filed.       Anesthesia Type:  Not documented  General    Reportable Event: NO     PAIN: Uncomplicated   Sign Out status: Comfortable, Well controlled pain     PONV: No PONV   Sign Out status:  No Nausea or Vomiting     Neuro/Psych: Uneventful perioperative course   Sign Out Status: Preoperative baseline; Age appropriate mentation     Airway/Resp.: Uneventful perioperative course   Sign Out Status: Non labored breathing, age appropriate RR; Resp. Status within EXPECTED Parameters     CV: Uneventful perioperative course   Sign Out status: Appropriate BP and perfusion indices; Appropriate HR/Rhythm     Disposition:   Sign Out in:  PACU  Disposition:  Phase II; Home  Recovery Course: Uneventful  Follow-Up: Not required           Last Anesthesia Record Vitals:  CRNA VITALS  10/28/2019 0922 - 10/28/2019 1022      10/28/2019             EKG:  Sinus tachycardia          Last PACU Vitals:  Vitals Value Taken Time   /96 10/28/2019 10:22 AM   Temp 37.1  C (98.8  F) 10/28/2019 10:12 AM   Pulse 92 10/28/2019 10:15 AM   Resp 16 10/28/2019 10:22 AM   SpO2 95 % 10/28/2019 10:22 AM   Temp src Axillary 10/28/2019 10:00 AM   NIBP 84/72 10/28/2019  9:49 AM   Pulse 103 10/28/2019  9:51 AM   SpO2 97 % 10/28/2019  9:51 AM   Resp     Temp     Ht Rate 95 10/28/2019  9:46 AM   Temp 2 36.7  C (98.1  F) 10/28/2019  9:46 AM   Vitals shown include unvalidated device data.      Electronically Signed By: José Caceres MD, 2019, 12:59 PM

## 2019-10-28 NOTE — OP NOTE
Procedure Date: 10/28/2019      PREOPERATIVE DIAGNOSES:   1.  Bilateral inferior turbinate hypertrophy.   2.  Nasal obstruction.   3.  Septal perforation with evidence of prior septoplasty and turbinate reduction.      POSTOPERATIVE DIAGNOSES:   1.  Bilateral inferior turbinate hypertrophy.   2.  Nasal obstruction.   3.  Septal perforation with evidence of prior septoplasty and turbinate reduction.     PROCEDURE PERFORMED:  1. Bilateral endoscopic inferior turbinate submucous resection     ATTENDING SURGEON:  Clint Zaman MD      ASSISTANT:  None.      FINDINGS:  At time of procedure, there was a low anterior septal perforation from prior septoplasty.  There was bilateral inferior turbinate hypertrophy.  There was scarring at the head of the inferior turbinates bilaterally indicating prior turbinate procedure.  There was minimal bleeding.  There were no complications during the procedure.      INDICATIONS FOR PROCEDURE:  Donald Robins is a 34-year-old gentleman with evidence of turbinate hypertrophy who was complaining of persistent nasal obstruction despite undergoing prior nasal surgery including septoplasty.  He had a septal perforation evident preoperatively.  We discussed all risks, benefits and alternatives to bilateral inferior turbinate submucous resection in great detail.  These included but are not limited to:  Bleeding, empty nose syndrome, persistent sensation of nasal obstruction despite adequate surgery, need for additional procedures.  He expressed understanding and wishes to proceed.  Written informed consent was obtained.      DESCRIPTION OF PROCEDURE IN DETAIL:  The patient was identified in the preoperative holding area, and consent was confirmed.   He was subsequently brought back to the operating room by the Anesthesia Service, where he was intubated after induction without issue.  The eyes were protected.  A timeout was performed.  All were in agreement.  He was subsequently turned 180  degrees clockwise.  He was prepped and draped in standard fashion.  The nasal cavity was decongested using 1:1000 epinephrine-soaked pledgets. The entire procedure was performed under endoscopic visualization. A total of 9 mL was injected into bilateral inferior turbinates for hemostasis and to aid with submucous dissection.  We started on the left side.  An incision over the  head of the inferior turbinate was made using a Bovie cautery.  A plane over the bone was dissected using a Andrés elevator and suction [place].  The inferior turbinate bone was removed using Blakesley forceps.  The submucosa was reduced using a turbinate shaver.  Hemostasis was obtained using Bovie electrocautery.  We then turned our attention to the right side.  In a similar fashion to the left, an incision was made on head of the inferior turbinate using Bovie electrocautery.  A plane was developed using a Cole elevator over the bone.  Inferior turbinate bone was removed using a Blakesley elevator.  The submucosa was reduced using a turbinate shaver.  Hemostasis was obtained with a suction Bovie electrocautery.  Once hemostasis was ensured bilaterally, the procedure was concluded.  A small amount of Surgicel Fibrillar was placed over the inferior turbinate incisions bilaterally.   The patient was subsequently turned over to the Anesthesia Service for awakening from general anesthesia.      COMPLICATIONS:  None.      ESTIMATED BLOOD LOSS:  15 mL.      DISPOSITION:  To PACU, then home.         KIMBERLY PARSI MD             D: 10/28/2019   T: 10/28/2019   MT: BILL      Name:     VINCENT WETZEL   MRN:      [id number]        Account:        [id number]   :      1985           Procedure Date: 10/28/2019      Document: [id number]

## 2019-10-28 NOTE — ANESTHESIA CARE TRANSFER NOTE
Patient: Donald Robins    Procedure(s):  Bilateral Endoscopic Inferior Turbinate Submucous Resection    Diagnosis: Hypertrophy of both inferior nasal turbinates [J34.3]  Nasal obstruction [J34.89]  Diagnosis Additional Information: No value filed.    Anesthesia Type:   General     Note:  Airway :Face Mask  Patient transferred to:PACU  Comments: Arrive PACU, Stable, Airway Intact  146/109, 95,20,96%,99.0Handoff Report: Identifed the Patient, Identified the Reponsible Provider, Reviewed the pertinent medical history, Discussed the surgical course, Reviewed Intra-OP anesthesia mangement and issues during anesthesia, Set expectations for post-procedure period and Allowed opportunity for questions and acknowledgement of understanding      Vitals: (Last set prior to Anesthesia Care Transfer)    CRNA VITALS  10/28/2019 0922 - 10/28/2019 0958      10/28/2019             Resp Rate (observed):  14                Electronically Signed By: ROBINSON Antoine CRNA  October 28, 2019  9:58 AM

## 2019-10-28 NOTE — BRIEF OP NOTE
[facility] Brief Operative Note    Pre-operative diagnosis: Hypertrophy of both inferior nasal turbinates [J34.3]  Nasal obstruction [J34.89]   Post-operative diagnosis Same as above   Procedure: Procedure(s):  bilateral Inferior turbinate submucous resection ablation of bilateral nasal vestibular bodies.   Surgeon(s): Surgeon(s) and Role:     * Clint Zaman MD - Primary   Estimated blood loss: minimal    Specimens: None   Findings: Bilateral inferior turbinate hypertrophy; septal perfroation    Clint Zaman MD    [facility]  Rhinology  Endoscopic Skull Base Surgery  [facility]  Department of Otolaryngology - Head & Neck Surgery

## 2019-10-28 NOTE — DISCHARGE INSTRUCTIONS
1. Starting the day after surgery, start using nasal saline spray every 3-4 hours while awake to help clear the nose of dried blood and crusting.   2. Avoid nose-blowing. Use saline spray only  3. For any excessive nasal bleeding that is continuous or bothersome, spray 5 sprays of afrin nasal spray into the side that is bleeding and hold pressure for 10 minutes. If you find that you have to do this 4 times in one hour, or the bleeding is excessive or bothersome, please call your doctor.  4. Take narcotic pain pill only as prescribed. If you do not finish the prescription, please flush the remainder down the toilet.   5. Call your doctor's office with any additional questions or concerns.     Clint Zaman MD    [facility]  Rhinology  Endoscopic Skull Base Surgery  [facility]  Department of Otolaryngology - Head & Neck Surgery      [facility]  Home Care Following Anesthesia  For 24 hours after surgery:  1. Get plenty of rest.  A responsible adult must stay with you for at least 24 hours after you leave the surgery center.  2. Do not drive or use heavy equipment.  If you have weakness or tingling, don't drive or use heavy equipment until this feeling goes away.   3. Do not drink alcohol.   4. Avoid strenuous or risky activities.  Ask for help when climbing stairs.  5. You may feel lightheaded.  IF so, sit for a few minutes before standing.  Have someone help you get up.   6. If you have nausea (feel sick to your stomach): Drink only clear liquids such as apple juice, ginger ale, broth or 7-Up.  Rest may also help.  Be sure to drink enough fluids.  Move to a regular diet as you feel able.   7. You may have a slight fever.  Call the doctor if your fever is over 100 F (37.7 C) (taken under the tongue) or lasts longer than 24 hours.  8. You may have a dry mouth, a sore throat, muscle aches or trouble sleeping. These should go away  "after 24 hours.  9. Do not make important or legal decisions.        Today you received a Marcaine or bupivacaine block to numb the nerves near your surgery site.  This is a block using local anesthetic or \"numbing\" medication injected around the nerves to anesthetize or \"numb\" the area supplied by those nerves.  This block is injected into the muscle layer near your surgical site.  The medication may numb the location where you had surgery for 6-18 hours, but may last up to 24 hours.  If your surgical site is an arm or leg you should be careful with your affected limb, since it is possible to injure your limb without being aware of it due to the numbing.  Until full feeling returns, you should guard against bumping or hitting your limb, and avoid extreme hot or cold temperatures on the skin.  As the block wears off, the feeling will return as a tingling or prickly sensation near your surgical site.  You will experience more discomfort from your incision as the feeling returns.  You may want to take a pain pill (a narcotic or Tylenol if this was prescribed by your surgeon) when you start to experience mild pain before the pain beccomes more severe.  If your pain medications do not control your pain you should notifiy your surgeon.    Tips for taking pain medications  To get the best pain relief possible, remember these points:    Take pain medications as directed, before pain becomes severe.    Pain medication can upset your stomach: taking it with food may help.    Constipation is a common side effect of pain medication. Drink plenty of  fluids.    Eat foods high in fiber. Take a stool softener if recommended by your doctor or pharmacist.    Do not drink alcohol, drive or operate machinery while taking pain medications.    Ask about other ways to control pain, such as with heat, ice or relaxation.    Tylenol/Acetaminophen Consumption  To help encourage the safe use of acetaminophen, the makers of TYLENOL  have " lowered the maximum daily dose for single-ingredient Extra Strength TYLENOL  (acetaminophen) products sold in the [place] from 8 pills per day (4,000 mg) to 6 pills per day (3,000 mg). The dosing interval has also changed from 2 pills every 4-6 hours to 2 pills every 6 hours.    If you feel your pain relief is insufficient, you may take Tylenol/Acetaminophen in addition to your narcotic pain medication.     Be careful not to exceed 3,000 mg of Tylenol/Acetaminophen in a 24 hour period from all sources.    If you are taking extra strength Tylenol/acetaminophen (500 mg), the maximum dose is 6 tablets in 24 hours.    If you are taking regular strength acetaminophen (325 mg), the maximum dose is 9 tablets in 24 hours.    Call a doctor for any of the followin. Signs of infection (fever, growing tenderness at the surgery site, a large amount of drainage or bleeding, severe pain, foul-smelling drainage, redness, swelling).  2. It has been over 8 to 10 hours since surgery and you are still not able to urinate (pass water).  3. Headache for over 24 hours.  4. Numbness, tingling or weakness the day after surgery (if you had spinal anesthesia).  Your doctor is:       Dr. Clint Zaman, ENT Otolaryngology: [phone]               Or dial [phone] and ask for the resident on call for:  ENT Otolaryngology  For emergency care, call the:  [facility]:  [phone] (TTY for hearing impaired: [phone])

## 2019-10-28 NOTE — BRIEF OP NOTE
[facility] Brief Operative Note    Pre-operative diagnosis: Hypertrophy of both inferior nasal turbinates [J34.3]  Nasal obstruction [J34.89]   Post-operative diagnosis Same   Procedure: Procedure(s):  Bilateral Endoscopic Inferior Turbinate Submucous Resection   Surgeon(s): Surgeon(s) and Role:     * Clint Zaman MD - Primary   Estimated blood loss: 15 mL    Specimens: none   Findings: bilat IT hypertrophy with evidence of prior surgery; low anterior septal perforation    Clint Zaman MD    [facility]  Rhinology  Endoscopic Skull Base Surgery  [facility]  Department of Otolaryngology - Head & Neck Surgery

## 2019-10-30 ENCOUNTER — OFFICE VISIT (OUTPATIENT)
Dept: OTOLARYNGOLOGY | Facility: CLINIC | Age: 34
End: 2019-10-30
Payer: COMMERCIAL

## 2019-10-30 VITALS — WEIGHT: 211 LBS | BODY MASS INDEX: 30.21 KG/M2 | HEIGHT: 70 IN

## 2019-10-30 DIAGNOSIS — R09.81 NASAL CONGESTION: ICD-10-CM

## 2019-10-30 DIAGNOSIS — J34.3 HYPERTROPHY OF INFERIOR NASAL TURBINATE: ICD-10-CM

## 2019-10-30 DIAGNOSIS — J34.3 HYPERTROPHY OF BOTH INFERIOR NASAL TURBINATES: Primary | ICD-10-CM

## 2019-10-30 RX ORDER — METHYLPREDNISOLONE 4 MG
TABLET, DOSE PACK ORAL
Qty: 21 TABLET | Refills: 0 | Status: SHIPPED | OUTPATIENT
Start: 2019-10-30

## 2019-10-30 ASSESSMENT — PAIN SCALES - GENERAL: PAINLEVEL: NO PAIN (0)

## 2019-10-30 ASSESSMENT — MIFFLIN-ST. JEOR: SCORE: 1913.34

## 2019-10-30 NOTE — PROGRESS NOTES
"                   [facility]                       Return Visit      Encounter date: October 30, 2019    Chief Complaint: nasal obstruction    Interval History: Donald Robins is a 34-year-old gentleman who is status post bilateral inferior turbinate submucous resection performed on Monday.  He presents for follow-up.  Is been doing well.  Has had minimal bleeding and little pain.  He reports a left-sided nasal obstruction.  He is not using any saline spray.      [place] [facility]ative [facility]  Procedure Date: 10/28/2019      PREOPERATIVE DIAGNOSES:   1.  Bilateral inferior turbinate hypertrophy.   2.  Nasal obstruction.   3.  Septal perforation with evidence of prior septoplasty and turbinate reduction.      POSTOPERATIVE DIAGNOSES:   1.  Bilateral inferior turbinate hypertrophy.   2.  Nasal obstruction.   3.  Septal perforation with evidence of prior septoplasty and turbinate reduction.      ATTENDING SURGEON:  Clint Zaman MD      ASSISTANT:  None.      FINDINGS:  At time of procedure, there was a low anterior septal perforation from prior septoplasty.  There was bilateral inferior turbinate hypertrophy.  There was scarring at the head of the inferior turbinates bilaterally indicating prior turbinate procedure.  There was minimal bleeding.  There were no complications during the procedure.     Review of systems: A 14-point review of systems has been conducted and is negative for any notable symptoms, except as dictated in the history of present illness.     Physical Exam:  Vital signs: Ht 1.778 m (5' 10\")   Wt 95.7 kg (211 lb)   BMI 30.28 kg/m     General Appearance: No acute distress, appropriate demeanor, conversant  Eyes: moist conjunctivae; EOMI; pupils symmetric; visual acuity grossly intact; no proptosis  Head: normocephalic; overall symmetric appearance without deformity  Face: overall symmetric without deformity; HB I-VI  Ears: Normal appearance of external ear; external meatus normal in " appearance; TMs intact without perforation bilaterally;   Nose: No external deformity; septum with low-anterior perforation, which was present pre-op; inferior turbinates with expected crusting and mild bloody drainage; Normal appearance of mucosa; tongue midline; no mass or lesions; tonsils oropharynx without obvious mucosal abnormality      Procedure Note  Procedure performed: Rigid nasal endoscopy  Indication: To evaluate for sinonasal pathology not visualized on routine anterior rhinoscopy; status post IT reduction; post-op mild epistaxis  Anesthesia: 4% topical lidocaine with 0.05 % oxymetazoline  Description of procedure: A 30 degree, 3 mm rigid endoscope was inserted into bilateral nasal cavities and the nasal valves, nasal cavity, middle meatus, sphenoethmoid recess, nasopharynx evaluated for evidence of obstruction, edema, epistaxis.     Findings  Low-anterior septal perforation  RT: expected post-op granulation at head of inferior turbinate with small amount of blood  LT: expected post-op granulation with mod edema at head of inferior turbinate; suction performed to remove mucus at ; no synechiae formation; expected granulation and edema    The patient tolerated the procedure well without complication.     Laboratory Review:  n/a    Imaging Review:  n/a    Pathology Review:  n/a    Assessment/Medical Decision Making:  S/p bilat IT submucous resection 10/28/19.       Plan:  1. Healing as expected  2. Medrol dose corey prescribed to reduce IT edema  3. Will travel to [place] today  4. Follow up with me as needed    Clint Zaman MD    [facility]  Rhinology, Endoscopic Skull Base Surgery  [facility]  Department of Otolaryngology - Head & Neck Surgery    Additional portions of the patient's have been reviewed below.    ~~~~~~~~~~~~~~~~~~~~~~~~~~~~~~~~~~~~~~~~~~~~~~~~~~~~~~~~~~~~~~~~~~~~~~~~~~~~~~~~~~~~~~~~~~~~~~~~~~~~~~~~~~~~~~~~~~~~~~~~~~~~~~~~~~~~~~~    Past Medical History:   Diagnosis Date     Hypertrophy of both inferior nasal turbinates 10/22/2019     Nasal congestion 8/19/2019     Tinnitus         Past Surgical History:   Procedure Laterality Date     SEPTOPLASTY      not helpful     TURBINATE REDUCTION Bilateral 10/28/2019    Procedure: Bilateral Endoscopic Inferior Turbinate Submucous Resection;  Surgeon: Clint Zaman MD;  Location:  OR     [place] teeth extraction          Family History   Problem Relation Age of Onset     Diabetes Father      Anemia Sister         Social History     Socioeconomic History     Marital status: Single     Spouse name: None     Number of children: None     Years of education: None     Highest education level: None   Occupational History     None   Social Needs     Financial resource strain: None     Food insecurity:     Worry: None     Inability: None     Transportation needs:     Medical: None     Non-medical: None   Tobacco Use     Smoking status: Never Smoker     Smokeless tobacco: Never Used   Substance and Sexual Activity     Alcohol use: Yes     Comment: social     Drug use: Never     Sexual activity: Yes     Partners: Female     Birth control/protection: Pull-out method, Condom   Lifestyle     Physical activity:     Days per week: None     Minutes per session: None     Stress: None   Relationships     Social connections:     Talks on phone: None     Gets together: None     Attends Orthodox service: None     Active member of club or organization: None     Attends meetings of clubs or organizations: None     Relationship status: None     Intimate partner violence:     Fear of current or ex partner: None     Emotionally abused: None     Physically abused: None     Forced sexual activity: None   Other Topics Concern     None   Social History Narrative    [employer]; [employer]  ; 5 yrs.

## 2019-10-30 NOTE — LETTER
"10/30/2019     RE: Donald Robins  [address]  [place] [address]     Dear Colleague,    Thank you for referring your patient, Donald Robins, to the [facility] EAR NOSE AND THROAT at [facility]. Please see a copy of my visit note below.             [facility]             Return Visit      Encounter date: October 30, 2019    Chief Complaint: nasal obstruction    Interval History: Donald Robins is a 34-year-old gentleman who is status post bilateral inferior turbinate submucous resection performed on Monday.  He presents for follow-up.  Is been doing well.  Has had minimal bleeding and little pain.  He reports a left-sided nasal obstruction.  He is not using any saline spray.    [place] Operative History  Procedure Date: 10/28/2019      PREOPERATIVE DIAGNOSES:   1.  Bilateral inferior turbinate hypertrophy.   2.  Nasal obstruction.   3.  Septal perforation with evidence of prior septoplasty and turbinate reduction.      POSTOPERATIVE DIAGNOSES:   1.  Bilateral inferior turbinate hypertrophy.   2.  Nasal obstruction.   3.  Septal perforation with evidence of prior septoplasty and turbinate reduction.      ATTENDING SURGEON:  Clint Zaman MD      ASSISTANT:  None.      FINDINGS:  At time of procedure, there was a low anterior septal perforation from prior septoplasty.  There was bilateral inferior turbinate hypertrophy.  There was scarring at the head of the inferior turbinates bilaterally indicating prior turbinate procedure.  There was minimal bleeding.  There were no complications during the procedure.     Review of systems: A 14-point review of systems has been conducted and is negative for any notable symptoms, except as dictated in the history of present illness.     Physical Exam:  Vital signs: Ht 1.778 m (5' 10\")   Wt 95.7 kg (211 lb)   BMI 30.28 kg/m      General Appearance: No acute distress, appropriate demeanor, conversant  Eyes: moist conjunctivae; " EOMI; pupils symmetric; visual acuity grossly intact; no proptosis  Head: normocephalic; overall symmetric appearance without deformity  Face: overall symmetric without deformity; HB I-VI  Ears: Normal appearance of external ear; external meatus normal in appearance; TMs intact without perforation bilaterally;   Nose: No external deformity; septum with low-anterior perforation, which was present pre-op; inferior turbinates with expected crusting and mild bloody drainage; Normal appearance of mucosa; tongue midline; no mass or lesions; tonsils oropharynx without obvious mucosal abnormality    Procedure Note  Procedure performed: Rigid nasal endoscopy  Indication: To evaluate for sinonasal pathology not visualized on routine anterior rhinoscopy; status post IT reduction; post-op mild epistaxis  Anesthesia: 4% topical lidocaine with 0.05 % oxymetazoline  Description of procedure: A 30 degree, 3 mm rigid endoscope was inserted into bilateral nasal cavities and the nasal valves, nasal cavity, middle meatus, sphenoethmoid recess, nasopharynx evaluated for evidence of obstruction, edema, epistaxis.     Findings  Low-anterior septal perforation  RT: expected post-op granulation at head of inferior turbinate with small amount of blood  LT: expected post-op granulation with mod edema at head of inferior turbinate; suction performed to remove mucus at ; no synechiae formation; expected granulation and edema    The patient tolerated the procedure well without complication.     Laboratory Review:  n/a    Imaging Review:  n/a    Pathology Review:  n/a    Assessment/Medical Decision Making:  S/p bilat IT submucous resection 10/28/19.     Plan:  1. Healing as expected  2. Medrol dose corey prescribed to reduce IT edema  3. Will travel to [place] today  4. Follow up with me as needed    Clint Zaman MD    [facility]  Rhinology, Endoscopic Skull Base Surgery  [facility]   Department of Otolaryngology - Head & Neck Surgery    Additional portions of the patient's have been reviewed below.   ~~~~~~~~~~~~~~~~~~~~~~~~~~~~~~~~~~~~~~~~~~~~~~~~~~~~~~~~~~~~~~~~~~~~~~~~~~~~~~~    Past Medical History:   Diagnosis Date     Hypertrophy of both inferior nasal turbinates 10/22/2019     Nasal congestion 8/19/2019     Tinnitus         Past Surgical History:   Procedure Laterality Date     SEPTOPLASTY      not helpful     TURBINATE REDUCTION Bilateral 10/28/2019    Procedure: Bilateral Endoscopic Inferior Turbinate Submucous Resection;  Surgeon: Clint Zaman MD;  Location:  OR     [place] teeth extraction          Family History   Problem Relation Age of Onset     Diabetes Father      Anemia Sister         Social History     Socioeconomic History     Marital status: Single     Spouse name: None     Number of children: None     Years of education: None     Highest education level: None   Occupational History     None   Social Needs     Financial resource strain: None     Food insecurity:     Worry: None     Inability: None     Transportation needs:     Medical: None     Non-medical: None   Tobacco Use     Smoking status: Never Smoker     Smokeless tobacco: Never Used   Substance and Sexual Activity     Alcohol use: Yes     Comment: social     Drug use: Never     Sexual activity: Yes     Partners: Female     Birth control/protection: Pull-out method, Condom   Lifestyle     Physical activity:     Days per week: None     Minutes per session: None     Stress: None   Relationships     Social connections:     Talks on phone: None     Gets together: None     Attends [employer] service: None     Active member of club or organization: None     Attends meetings of clubs or organizations: None     Relationship status: None     Intimate partner violence:     Fear of current or ex partner: None     Emotionally abused: None     Physically abused: None     Forced sexual activity: None   Other Topics Concern      None   Social History Narrative    [employer]; ; 5 yrs.            Again, thank you for allowing me to participate in the care of your patient.      Sincerely,    Clint Zaman MD

## 2019-11-14 ENCOUNTER — TELEPHONE (OUTPATIENT)
Dept: OTOLARYNGOLOGY | Facility: CLINIC | Age: 34
End: 2019-11-14

## 2019-11-14 NOTE — TELEPHONE ENCOUNTER
Spoke with pt in regards to his concerns. I explained the provider and RN are out of clinic until tomorrow. Pt denies any concerns needing immediate attention.    Chichi Cheng CMA

## 2019-11-14 NOTE — TELEPHONE ENCOUNTER
[facility] Call Center    Phone Message    May a detailed message be left on voicemail: yes    Reason for Call: Symptoms or Concerns     If patient has red-flag symptoms, warm transfer to triage line    Current symptom or concern: Pt has post surgical questions.  He had a procedure done on 10/28/19 and his nose is still very tender, when pt presses on his nose he feels a nerve in his tooth, he still has a bit of blood and just wondering how long this will happen    Symptoms have been present for:  3 week(s)    Has patient previously been seen for this? Yes    By Dr. Zaman    Date: Today    Are there any new or worsening symptoms? Yes:       Action Taken: Message routed to:  [facility] ([facility]): ENT

## 2019-11-15 ENCOUNTER — MYC MEDICAL ADVICE (OUTPATIENT)
Dept: OTOLARYNGOLOGY | Facility: CLINIC | Age: 34
End: 2019-11-15

## 2019-11-15 NOTE — TELEPHONE ENCOUNTER
Per Dr. Zaman, spoke to patient and informed him these symptoms are normal for 1-2 months after surgery. Advised using saline nasal spray or vaseline to keep nasal passages moist. Encouraged patient to call if he develops yellow, thick, or odorous drainage. Per patient's request, will send [employer] message with phone number for Savannah Bell RN to call with future concerns.    Batool Matias, EMT

## 2020-03-11 ENCOUNTER — HEALTH MAINTENANCE LETTER (OUTPATIENT)
Age: 35
End: 2020-03-11

## 2021-01-03 ENCOUNTER — HEALTH MAINTENANCE LETTER (OUTPATIENT)
Age: 36
End: 2021-01-03

## 2021-04-25 ENCOUNTER — HEALTH MAINTENANCE LETTER (OUTPATIENT)
Age: 36
End: 2021-04-25

## 2021-10-10 ENCOUNTER — HEALTH MAINTENANCE LETTER (OUTPATIENT)
Age: 36
End: 2021-10-10

## 2022-05-17 ENCOUNTER — APPOINTMENT (RX ONLY)
Dept: URBAN - METROPOLITAN AREA CLINIC 141 | Facility: CLINIC | Age: 37
Setting detail: DERMATOLOGY
End: 2022-05-17

## 2022-05-17 DIAGNOSIS — L30.9 DERMATITIS, UNSPECIFIED: ICD-10-CM | Status: INADEQUATELY CONTROLLED

## 2022-05-17 DIAGNOSIS — D485 NEOPLASM OF UNCERTAIN BEHAVIOR OF SKIN: ICD-10-CM

## 2022-05-17 PROBLEM — D48.5 NEOPLASM OF UNCERTAIN BEHAVIOR OF SKIN: Status: ACTIVE | Noted: 2022-05-17

## 2022-05-17 PROCEDURE — ? PRESCRIPTION

## 2022-05-17 PROCEDURE — ? COUNSELING

## 2022-05-17 PROCEDURE — ? BIOPSY BY SHAVE METHOD

## 2022-05-17 PROCEDURE — 99204 OFFICE O/P NEW MOD 45 MIN: CPT | Mod: 25

## 2022-05-17 PROCEDURE — 11102 TANGNTL BX SKIN SINGLE LES: CPT

## 2022-05-17 RX ORDER — TACROLIMUS 1 MG/G
OINTMENT TOPICAL
Qty: 60 | Refills: 0 | Status: ERX | COMMUNITY
Start: 2022-05-17

## 2022-05-17 RX ADMIN — TACROLIMUS: 1 OINTMENT TOPICAL at 00:00

## 2022-05-17 ASSESSMENT — LOCATION SIMPLE DESCRIPTION DERM
LOCATION SIMPLE: PENIS
LOCATION SIMPLE: SCROTUM

## 2022-05-17 ASSESSMENT — LOCATION DETAILED DESCRIPTION DERM
LOCATION DETAILED: LEFT SCROTUM
LOCATION DETAILED: DORSAL GLANS
LOCATION DETAILED: RIGHT ANTERIOR SCROTUM

## 2022-05-17 ASSESSMENT — LOCATION ZONE DERM
LOCATION ZONE: GENITALIA
LOCATION ZONE: PENIS

## 2022-05-17 NOTE — PROCEDURE: BIOPSY BY SHAVE METHOD
Hide Accession Number?: No
Silver Nitrate Text: The wound bed was treated with silver nitrate after the biopsy was performed.
Anesthesia Volume In Cc: 0.5
Dressing: bandage
X Size Of Lesion In Cm: 0
Depth Of Biopsy: dermis
Biopsy Method: Dermablade
Curettage Text: The wound bed was treated with curettage after the biopsy was performed.
Notification Instructions: Patient will be notified of biopsy results. However, patient instructed to call the office if not contacted within 2 weeks.
Was A Bandage Applied: Yes
Electrodesiccation Text: The wound bed was treated with electrodesiccation after the biopsy was performed.
Consent: Written consent was obtained and risks were reviewed including but not limited to scarring, infection, bleeding, scabbing, incomplete removal, nerve damage and allergy to anesthesia.
Detail Level: Detailed
Information: Selecting Yes will display possible errors in your note based on the variables you have selected. This validation is only offered as a suggestion for you. PLEASE NOTE THAT THE VALIDATION TEXT WILL BE REMOVED WHEN YOU FINALIZE YOUR NOTE. IF YOU WANT TO FAX A PRELIMINARY NOTE YOU WILL NEED TO TOGGLE THIS TO 'NO' IF YOU DO NOT WANT IT IN YOUR FAXED NOTE.
Billing Type: Third-Party Bill
Hemostasis: Drysol
Biopsy Type: H and E
Electrodesiccation And Curettage Text: The wound bed was treated with electrodesiccation and curettage after the biopsy was performed.
Wound Care: Petrolatum
Anesthesia Type: 1% lidocaine with epinephrine
Cryotherapy Text: The wound bed was treated with cryotherapy after the biopsy was performed.
Post-Care Instructions: I reviewed with the patient in detail post-care instructions. Patient is to keep the biopsy site dry overnight, and then apply bacitracin twice daily until healed. Patient may apply hydrogen peroxide soaks to remove any crusting.
Type Of Destruction Used: Curettage

## 2022-05-17 NOTE — PROCEDURE: MIPS QUALITY
Quality 110: Preventive Care And Screening: Influenza Immunization: Influenza Immunization not Administered for Documented Reasons.
Detail Level: Detailed
Quality 154 Part A: Falls: Risk Assessment (Should Be Reported With Measure 155.): Falls risk assessment completed and documented in the past 12 months.
Quality 226: Preventive Care And Screening: Tobacco Use: Screening And Cessation Intervention: Patient screened for tobacco use and is an ex/non-smoker
Quality 155 (Denominator): Falls Plan Of Care: Plan of Care not Documented, Reason not Otherwise Specified
Quality 111:Pneumonia Vaccination Status For Older Adults: Pneumococcal Vaccination not Administered or Previously Received, Reason not Otherwise Specified
Quality 47: Advance Care Plan: Advance Care Planning discussed and documented in the medical record; patient did not wish or was not able to name a surrogate decision maker or provide an advance care plan.
Quality 431: Preventive Care And Screening: Unhealthy Alcohol Use - Screening: Patient not identified as an unhealthy alcohol user when screened for unhealthy alcohol use using a systematic screening method
Quality 154 Part B: Falls: Risk Screening (Should Be Reported With Measure 155.): Patient screened for future fall risk; documentation of no falls in the past year or only one fall without injury in the past year

## 2022-05-21 ENCOUNTER — HEALTH MAINTENANCE LETTER (OUTPATIENT)
Age: 37
End: 2022-05-21

## 2022-06-06 ENCOUNTER — APPOINTMENT (RX ONLY)
Dept: URBAN - METROPOLITAN AREA CLINIC 141 | Facility: CLINIC | Age: 37
Setting detail: DERMATOLOGY
End: 2022-06-06

## 2022-06-06 DIAGNOSIS — L30.9 DERMATITIS, UNSPECIFIED: ICD-10-CM

## 2022-06-06 DIAGNOSIS — A63.0 ANOGENITAL (VENEREAL) WARTS: ICD-10-CM

## 2022-06-06 PROCEDURE — ? LIQUID NITROGEN

## 2022-06-06 PROCEDURE — ? PATHOLOGY DISCUSSION

## 2022-06-06 PROCEDURE — ? TREATMENT REGIMEN

## 2022-06-06 PROCEDURE — 99214 OFFICE O/P EST MOD 30 MIN: CPT | Mod: 25

## 2022-06-06 PROCEDURE — ? PRESCRIPTION

## 2022-06-06 PROCEDURE — 17110 DESTRUCTION B9 LES UP TO 14: CPT

## 2022-06-06 PROCEDURE — ? COUNSELING

## 2022-06-06 RX ORDER — DESONIDE 0.5 MG/G
CREAM TOPICAL
Qty: 60 | Refills: 1 | Status: ERX | COMMUNITY
Start: 2022-06-06

## 2022-06-06 RX ADMIN — DESONIDE: 0.5 CREAM TOPICAL at 00:00

## 2022-06-06 ASSESSMENT — LOCATION DETAILED DESCRIPTION DERM
LOCATION DETAILED: RIGHT ANTERIOR SCROTUM
LOCATION DETAILED: DORSAL GLANS
LOCATION DETAILED: LEFT SUPRAPUBIC SKIN
LOCATION DETAILED: LEFT SCROTUM
LOCATION DETAILED: VENTRAL PENILE SHAFT
LOCATION DETAILED: DORSAL PENILE SHAFT
LOCATION DETAILED: RIGHT SUPRAPUBIC SKIN

## 2022-06-06 ASSESSMENT — LOCATION ZONE DERM
LOCATION ZONE: TRUNK
LOCATION ZONE: GENITALIA
LOCATION ZONE: PENIS

## 2022-06-06 ASSESSMENT — LOCATION SIMPLE DESCRIPTION DERM
LOCATION SIMPLE: PENIS
LOCATION SIMPLE: SCROTUM
LOCATION SIMPLE: GROIN

## 2022-06-06 NOTE — PROCEDURE: MIPS QUALITY
Quality 431: Preventive Care And Screening: Unhealthy Alcohol Use - Screening: Patient not identified as an unhealthy alcohol user when screened for unhealthy alcohol use using a systematic screening method
Quality 110: Preventive Care And Screening: Influenza Immunization: Influenza Immunization not Administered for Documented Reasons.
Quality 111:Pneumonia Vaccination Status For Older Adults: Pneumococcal Vaccination not Administered or Previously Received, Reason not Otherwise Specified
Detail Level: Detailed
Quality 47: Advance Care Plan: Advance Care Planning discussed and documented in the medical record; patient did not wish or was not able to name a surrogate decision maker or provide an advance care plan.
Quality 154 Part A: Falls: Risk Assessment (Should Be Reported With Measure 155.): Falls risk assessment completed and documented in the past 12 months.
Quality 155 (Denominator): Falls Plan Of Care: Plan of Care not Documented, Reason not Otherwise Specified
Quality 226: Preventive Care And Screening: Tobacco Use: Screening And Cessation Intervention: Patient screened for tobacco use and is an ex/non-smoker
Quality 154 Part B: Falls: Risk Screening (Should Be Reported With Measure 155.): Patient screened for future fall risk; documentation of no falls in the past year or only one fall without injury in the past year

## 2022-06-06 NOTE — PROCEDURE: LIQUID NITROGEN
Consent: The patient's consent was obtained including but not limited to risks of crusting, scabbing, blistering, scarring, darker or lighter pigmentary change, recurrence, incomplete removal and infection.
Show Spray Paint Technique Variable?: Yes
Spray Paint Technique: No
Detail Level: Detailed
Spray Paint Text: The liquid nitrogen was applied to the skin utilizing a spray paint frosting technique.
Medical Necessity Clause: This procedure was medically necessary because the lesions that were treated were:
Post-Care Instructions: I reviewed with the patient in detail post-care instructions. Patient is to wear sunprotection, and avoid picking at any of the treated lesions. Pt may apply Vaseline to crusted or scabbing areas.
Medical Necessity Information: It is in your best interest to select a reason for this procedure from the list below. All of these items fulfill various CMS LCD requirements except the new and changing color options.

## 2022-09-18 ENCOUNTER — HEALTH MAINTENANCE LETTER (OUTPATIENT)
Age: 37
End: 2022-09-18

## 2022-10-18 ENCOUNTER — WALK IN (OUTPATIENT)
Dept: URGENT CARE | Age: 37
End: 2022-10-18

## 2022-10-18 VITALS
OXYGEN SATURATION: 98 % | HEART RATE: 78 BPM | WEIGHT: 225 LBS | RESPIRATION RATE: 18 BRPM | SYSTOLIC BLOOD PRESSURE: 118 MMHG | TEMPERATURE: 98.2 F | DIASTOLIC BLOOD PRESSURE: 72 MMHG

## 2022-10-18 DIAGNOSIS — H00.035 CELLULITIS OF LEFT LOWER EYELID: ICD-10-CM

## 2022-10-18 DIAGNOSIS — H00.015 HORDEOLUM EXTERNUM OF LEFT LOWER EYELID: Primary | ICD-10-CM

## 2022-10-18 PROCEDURE — 99213 OFFICE O/P EST LOW 20 MIN: CPT | Performed by: FAMILY MEDICINE

## 2022-10-18 RX ORDER — GENTAMICIN SULFATE 3 MG/ML
2 SOLUTION/ DROPS OPHTHALMIC EVERY 4 HOURS
Qty: 5 ML | Refills: 0 | Status: SHIPPED | OUTPATIENT
Start: 2022-10-18

## 2022-10-18 RX ORDER — CEPHALEXIN 500 MG/1
500 CAPSULE ORAL 3 TIMES DAILY
Qty: 21 CAPSULE | Refills: 0 | Status: SHIPPED | OUTPATIENT
Start: 2022-10-18 | End: 2022-10-25

## 2022-10-18 ASSESSMENT — ENCOUNTER SYMPTOMS
APPETITE CHANGE: 0
EYE PAIN: 0
EYE DISCHARGE: 1
EYE ITCHING: 0
SHORTNESS OF BREATH: 0
HEADACHES: 0
COUGH: 0
PHOTOPHOBIA: 0
EYE REDNESS: 1
WHEEZING: 0
ACTIVITY CHANGE: 0
FEVER: 0
LIGHT-HEADEDNESS: 0
DIZZINESS: 0
BACK PAIN: 0
FACIAL ASYMMETRY: 0
ABDOMINAL PAIN: 0

## 2023-06-04 ENCOUNTER — HEALTH MAINTENANCE LETTER (OUTPATIENT)
Age: 38
End: 2023-06-04

## 2023-10-18 ENCOUNTER — OCC HEALTH (OUTPATIENT)
Dept: OCCUPATIONAL MEDICINE | Age: 38
End: 2023-10-18

## 2023-10-18 VITALS
BODY MASS INDEX: 33.32 KG/M2 | HEART RATE: 70 BPM | HEIGHT: 71 IN | WEIGHT: 238 LBS | SYSTOLIC BLOOD PRESSURE: 120 MMHG | DIASTOLIC BLOOD PRESSURE: 70 MMHG

## 2023-10-18 DIAGNOSIS — Z02.89 VISIT FOR OCCUPATIONAL HEALTH EXAMINATION: Primary | ICD-10-CM

## 2023-10-18 PROCEDURE — OH021 PRE PLACEMENT PHYSICAL EXAM: Performed by: FAMILY MEDICINE

## 2023-10-18 PROCEDURE — OH062 RESPIRATOR FIT TESTING: Performed by: FAMILY MEDICINE

## 2024-01-09 ENCOUNTER — OFFICE VISIT (OUTPATIENT)
Dept: OCCUPATIONAL MEDICINE | Age: 39
End: 2024-01-09

## 2024-01-09 DIAGNOSIS — Z00.8 HEALTH EXAMINATION IN POPULATION SURVEY: Primary | ICD-10-CM

## 2024-01-09 PROCEDURE — OH035 DOT/N-DOT DS COLLECTION ONLY (ALL TYPES): Performed by: NURSE PRACTITIONER

## 2024-05-30 ENCOUNTER — WALK IN (OUTPATIENT)
Dept: URGENT CARE | Age: 39
End: 2024-05-30

## 2024-05-30 VITALS
DIASTOLIC BLOOD PRESSURE: 76 MMHG | TEMPERATURE: 97 F | OXYGEN SATURATION: 97 % | SYSTOLIC BLOOD PRESSURE: 118 MMHG | HEART RATE: 74 BPM | RESPIRATION RATE: 16 BRPM

## 2024-05-30 DIAGNOSIS — H10.9 CONJUNCTIVITIS OF BOTH EYES, UNSPECIFIED CONJUNCTIVITIS TYPE: Primary | ICD-10-CM

## 2024-05-30 PROCEDURE — 99213 OFFICE O/P EST LOW 20 MIN: CPT | Performed by: NURSE PRACTITIONER

## 2024-05-30 RX ORDER — OFLOXACIN 3 MG/ML
2 SOLUTION/ DROPS OPHTHALMIC 4 TIMES DAILY
Qty: 5 ML | Refills: 0 | Status: SHIPPED | OUTPATIENT
Start: 2024-05-30 | End: 2024-06-12

## 2024-05-30 ASSESSMENT — ENCOUNTER SYMPTOMS: EYE REDNESS: 1

## 2024-05-30 ASSESSMENT — VISUAL ACUITY: OU: 1

## 2025-01-28 ENCOUNTER — OFFICE VISIT (OUTPATIENT)
Dept: OCCUPATIONAL MEDICINE | Age: 40
End: 2025-01-28

## 2025-01-28 DIAGNOSIS — Z02.89 VISIT FOR OCCUPATIONAL HEALTH EXAMINATION: Primary | ICD-10-CM

## 2025-01-28 PROCEDURE — OH062 RESPIRATOR FIT TESTING: Performed by: PHYSICIAN ASSISTANT

## 2025-01-28 PROCEDURE — OH071 RESPIRATORY (PFT) QUESTIONNAIRE: Performed by: PHYSICIAN ASSISTANT

## 2029-09-17 ENCOUNTER — PRE VISIT (OUTPATIENT)
Dept: ALLERGY | Facility: CLINIC | Age: 44
End: 2029-09-17

## (undated) DEVICE — EYE STRIP FLUORESCEIN TEST 1MG BIO-GLO HUO900-11

## (undated) DEVICE — WIPE INSTRUMENT MEROCEL 400200

## (undated) DEVICE — SUCTION MANIFOLD NEPTUNE 2 SYS 1 PORT 702-025-000

## (undated) DEVICE — TUBING SUCTION 10'X3/16" N510

## (undated) DEVICE — PACK ENT ENDOSCOPY CUSTOM ASC

## (undated) DEVICE — BLADE TURBINATE INFERIOR 2MM ROTATE  1882040HR

## (undated) DEVICE — TUBING IRRIGATOR STRAIGHTSHOT XPS 1895522

## (undated) DEVICE — SURGICEL FIBRILLAR HEMOSTAT 2"X4" JJ1962

## (undated) DEVICE — NDL 25GA 2"  8881200441

## (undated) DEVICE — GLOVE PROTEXIS MICRO 7.5  2D73PM75

## (undated) DEVICE — ESU GROUND PAD ADULT W/CORD E7507

## (undated) DEVICE — LINEN TOWEL PACK X5 5464

## (undated) DEVICE — ENDO SHEATH STORZ SHARPSITE ENDOSCRUB 0DEG 4MM 1912000

## (undated) DEVICE — SYR 03ML LL W/O NDL 309657

## (undated) DEVICE — NDL SPINAL 22GA 5" QUINCKE 405148

## (undated) DEVICE — SOL NACL 0.9% IRRIG 1000ML BOTTLE 2F7124

## (undated) DEVICE — ESU ELEC NDL 1" COATED/INSULATED E1465

## (undated) DEVICE — ESU SUCTION CAUTERY 10FR FOOT CONTROL E2505-10FR

## (undated) DEVICE — SOL NACL 0.9% INJ 1000ML BAG 2B1324X

## (undated) DEVICE — SPONGE COTTONOID 2X1/2" 80-1406

## (undated) RX ORDER — OXYCODONE HYDROCHLORIDE 5 MG/1
TABLET ORAL
Status: DISPENSED
Start: 2019-10-28

## (undated) RX ORDER — PROPOFOL 10 MG/ML
INJECTION, EMULSION INTRAVENOUS
Status: DISPENSED
Start: 2019-10-28

## (undated) RX ORDER — CEFAZOLIN SODIUM 1 G/3ML
INJECTION, POWDER, FOR SOLUTION INTRAMUSCULAR; INTRAVENOUS
Status: DISPENSED
Start: 2019-10-28

## (undated) RX ORDER — HYDROMORPHONE HYDROCHLORIDE 1 MG/ML
INJECTION, SOLUTION INTRAMUSCULAR; INTRAVENOUS; SUBCUTANEOUS
Status: DISPENSED
Start: 2019-10-28

## (undated) RX ORDER — LIDOCAINE HYDROCHLORIDE AND EPINEPHRINE 10; 10 MG/ML; UG/ML
INJECTION, SOLUTION INFILTRATION; PERINEURAL
Status: DISPENSED
Start: 2019-10-28

## (undated) RX ORDER — ACETAMINOPHEN 325 MG/1
TABLET ORAL
Status: DISPENSED
Start: 2019-10-28

## (undated) RX ORDER — FENTANYL CITRATE 50 UG/ML
INJECTION, SOLUTION INTRAMUSCULAR; INTRAVENOUS
Status: DISPENSED
Start: 2019-10-28

## (undated) RX ORDER — DEXAMETHASONE SODIUM PHOSPHATE 10 MG/ML
INJECTION, SOLUTION INTRAMUSCULAR; INTRAVENOUS
Status: DISPENSED
Start: 2019-10-28

## (undated) RX ORDER — GABAPENTIN 300 MG/1
CAPSULE ORAL
Status: DISPENSED
Start: 2019-10-28

## (undated) RX ORDER — OXYMETAZOLINE HYDROCHLORIDE 0.05 G/100ML
SPRAY NASAL
Status: DISPENSED
Start: 2019-10-28

## (undated) RX ORDER — LIDOCAINE HYDROCHLORIDE 20 MG/ML
INJECTION, SOLUTION EPIDURAL; INFILTRATION; INTRACAUDAL; PERINEURAL
Status: DISPENSED
Start: 2019-10-28

## (undated) RX ORDER — EPINEPHRINE NASAL SOLUTION 1 MG/ML
SOLUTION NASAL
Status: DISPENSED
Start: 2019-10-28

## (undated) RX ORDER — ONDANSETRON 2 MG/ML
INJECTION INTRAMUSCULAR; INTRAVENOUS
Status: DISPENSED
Start: 2019-10-28